# Patient Record
Sex: FEMALE | Race: WHITE | NOT HISPANIC OR LATINO | Employment: OTHER | URBAN - METROPOLITAN AREA
[De-identification: names, ages, dates, MRNs, and addresses within clinical notes are randomized per-mention and may not be internally consistent; named-entity substitution may affect disease eponyms.]

---

## 2017-11-16 ENCOUNTER — OFFICE VISIT (OUTPATIENT)
Dept: CARDIOLOGY | Facility: CLINIC | Age: 72
End: 2017-11-16

## 2017-11-16 VITALS
DIASTOLIC BLOOD PRESSURE: 83 MMHG | WEIGHT: 144 LBS | HEART RATE: 59 BPM | SYSTOLIC BLOOD PRESSURE: 145 MMHG | HEIGHT: 63 IN | BODY MASS INDEX: 25.52 KG/M2

## 2017-11-16 DIAGNOSIS — E78.5 HYPERLIPIDEMIA, UNSPECIFIED HYPERLIPIDEMIA TYPE: ICD-10-CM

## 2017-11-16 DIAGNOSIS — I10 ESSENTIAL HYPERTENSION: Primary | ICD-10-CM

## 2017-11-16 PROCEDURE — 93000 ELECTROCARDIOGRAM COMPLETE: CPT | Performed by: INTERNAL MEDICINE

## 2017-11-16 PROCEDURE — 99213 OFFICE O/P EST LOW 20 MIN: CPT | Performed by: INTERNAL MEDICINE

## 2017-11-16 RX ORDER — TELMISARTAN 40 MG/1
40 TABLET ORAL DAILY
COMMUNITY

## 2017-11-16 RX ORDER — LANOLIN ALCOHOL/MO/W.PET/CERES
1000 CREAM (GRAM) TOPICAL DAILY
COMMUNITY

## 2017-11-16 NOTE — PROGRESS NOTES
Subjective:       Karly Nash is a 71 y.o. female who here for follow up    CC  Yearly follow-up for the hypertension and hyperlipidemia  HPI     71 years old white female with known history of benign essential arterial hypertension, hyperlipidemia here for the follow-up    Karly Nash  here for follow up with no complaints of chest pain, sob, palpitation, syncope, near syncope  No side effects with current meds  No pnd, orthopnea    Problem List Items Addressed This Visit        Cardiovascular and Mediastinum    Essential hypertension - Primary    Relevant Medications    telmisartan (MICARDIS) 40 MG tablet    Hyperlipidemia        .    The following portions of the patient's history were reviewed and updated as appropriate: allergies, current medications, past family history, past medical history, past social history, past surgical history and problem list.    Past Medical History:   Diagnosis Date   • Bloating    • Blood in stool    • Diverticulosis    • Exercise-induced reactive airway disease    • Family history of colonic polyps    • Flatulence    • Gas pain    • GERD (gastroesophageal reflux disease)    • History of CT scan of abdomen    • HTN (hypertension)    • IBS (irritable bowel syndrome)    • Osteoarthritis     reports that she quit smoking about 30 years ago. Her smoking use included Cigarettes. She has never used smokeless tobacco. She reports that she drinks about 4.2 oz of alcohol per week  She reports that she does not use illicit drugs.  Family History   Problem Relation Age of Onset   • Hypertension Mother    • Hyperlipidemia Mother    • Hypertension Father    • Hyperlipidemia Father    • Colon polyps Brother    • Diabetes Brother    • Hypertension Brother    • Hyperlipidemia Brother    • Cancer Paternal Grandmother      esophageal   • Crohn's disease Other    • Heart failure Maternal Aunt        Review of Systems  Constitutional: No wt loss, fever, fatigue  Gastrointestinal: No nausea,  "abdominal pain  Behavioral/Psych: No insomnia or anxiety   Cardiovascular No chest pains or tightness in chest  Objective:       Physical Exam             Physical Exam  /83  Pulse 59  Ht 62.5\" (158.8 cm)  Wt 144 lb (65.3 kg)  BMI 25.92 kg/m2    General appearance: NAD, conversant   Eyes: anicteric sclerae, moist conjunctivae; no lid-lag; PERRLA   HENT: Atraumatic; oropharynx clear with moist mucous membranes and no mucosal ulcerations;  normal hard and soft palate   Neck: Trachea midline; FROM, supple, no thyromegaly or lymphadenopathy   Lungs: CTA, with normal respiratory effort and no intercostal retractions   CV: S1-S2 regular, no murmurs, no rub, no gallop   Abdomen: Soft, non-tender; no masses or HSM   Extremities: No peripheral edema or extremity lymphadenopathy  Skin: Normal temperature, turgor and texture; no rash, ulcers or subcutaneous nodules   Psych: Appropriate affect, alert and oriented to person, place and time           Cardiographics  @  ECG 12 Lead  Date/Time: 11/16/2017 11:31 AM  Performed by: ALVINA NASCIMENTO  Authorized by: ALVINA NASCIMENTO   Comparison: compared with previous ECG   Similar to previous ECG  Rhythm: sinus rhythm  Clinical impression: normal ECG            Echocardiogram:        Current Outpatient Prescriptions:   •  acetaminophen (TYLENOL) 500 MG tablet, Take 500 mg by mouth Every 6 (Six) Hours As Needed for mild pain (1-3)., Disp: , Rfl:   •  ascorbic acid (VITAMIN C) 500 MG tablet, Take 500 mg by mouth daily., Disp: , Rfl:   •  aspirin 81 MG tablet, Take 81 mg by mouth daily., Disp: , Rfl:   •  Calcium Carb-Cholecalciferol (CALCIUM 500 +D PO), Take  by mouth., Disp: , Rfl:   •  cetirizine (ZyrTEC) 10 MG tablet, Take 10 mg by mouth daily as needed for allergies., Disp: , Rfl:   •  Cholecalciferol (VITAMIN D-3) 1000 UNITS capsule, Take by mouth daily., Disp: , Rfl:   •  Coenzyme Q-10 200 MG capsule, Take  by mouth., Disp: , Rfl:   •  cyclobenzaprine " (FLEXERIL) 10 MG tablet, Take 10 mg by mouth 3 (three) times a day as needed for muscle spasms., Disp: , Rfl:   •  dicyclomine (BENTYL) 20 MG tablet, Take 20 mg by mouth Every 6 (Six) Hours., Disp: , Rfl:   •  estradiol (VAGIFEM) 10 MCG tablet vaginal tablet, Insert 1 tablet into the vagina 2 (two) times a week., Disp: , Rfl:   •  Garlic (GARLIQUE PO), Take  by mouth., Disp: , Rfl:   •  glucosamine-chondroitin 500-400 MG per tablet, Take 1 tablet by mouth 2 (two) times a day., Disp: , Rfl:   •  guaiFENesin (MUCINEX) 600 MG 12 hr tablet, Take 1,200 mg by mouth 2 (Two) Times a Day., Disp: , Rfl:   •  ipratropium-albuterol (COMBIVENT RESPIMAT)  MCG/ACT inhaler, Inhale 1 puff 4 (Four) Times a Day As Needed for wheezing., Disp: , Rfl:   •  meloxicam (MOBIC) 7.5 MG tablet, Take 7.5 mg by mouth Daily., Disp: , Rfl:   •  nebivolol (BYSTOLIC) 20 MG tablet, Take 20 mg by mouth daily., Disp: , Rfl:   •  Omega-3 Fatty Acids (FISH OIL PO), Take 2,400 mg by mouth 2 (two) times a day., Disp: , Rfl:   •  Omega-3-acid Ethyl Esters (LOVAZA PO), Take 1 g by mouth Daily., Disp: , Rfl:   •  omeprazole (PriLOSEC) 20 MG capsule, Take 20 mg by mouth 2 (two) times a day., Disp: , Rfl:   •  oyster shell calcium 500 MG tablet tablet, Take  by mouth 2 (Two) Times a Day., Disp: , Rfl:   •  Probiotic Product (PROBIOTIC ADVANCED PO), Take  by mouth., Disp: , Rfl:   •  rosuvastatin (CRESTOR) 20 MG tablet, Take 10 mg by mouth Daily., Disp: , Rfl:   •  simethicone (MYLICON) 80 MG chewable tablet, Chew 80 mg Every 6 (Six) Hours As Needed for flatulence., Disp: , Rfl:   •  telmisartan (MICARDIS) 40 MG tablet, Take 40 mg by mouth Daily., Disp: , Rfl:   •  Triamcinolone Acetonide (NASACORT ALLERGY 24HR NA), into each nostril., Disp: , Rfl:   •  vitamin B-12 (CYANOCOBALAMIN) 1000 MCG tablet, Take 1,000 mcg by mouth Daily., Disp: , Rfl:   •  vitamin B-6 (PYRIDOXINE) 50 MG tablet, Take 50 mg by mouth daily., Disp: , Rfl:   •  ALPRAZolam (XANAX) 0.25  MG tablet, Take 0.25 mg by mouth 2 (Two) Times a Day As Needed for anxiety., Disp: , Rfl:   •  hydrocortisone (ANUSOL-HC) 2.5 % rectal cream, Insert  into the rectum 2 (Two) Times a Day., Disp: , Rfl:    Assessment:        Patient Active Problem List   Diagnosis   • Essential hypertension   • Hyperlipidemia               Plan:            ICD-10-CM ICD-9-CM   1. Essential hypertension I10 401.9   2. Hyperlipidemia, unspecified hyperlipidemia type E78.5 272.4     1. Essential hypertension  Blood pressure well-controlled    2. Hyperlipidemia, unspecified hyperlipidemia type  Risk of the hyperlipidemia, importance of the treatment has been explained    Pros and cons of the statins has been explained    Regular blood workup as well as side effects including the liver failure, myelopathy death has been explained      Follow up in one year    COUNSELING:    Karly Danielle was given to patient for the following topics: diagnostic results, risk factor reductions, impressions, risks and benefits of treatment options and importance of treatment compliance .       SMOKING COUNSELING:    Counseling given: Not Answered      EMR Dragon/Transcription disclaimer:   Much of this encounter note is an electronic transcription/translation of spoken language to printed text. The electronic translation of spoken language may permit erroneous, or at times, nonsensical words or phrases to be inadvertently transcribed; Although I have reviewed the note for such errors, some may still exist.

## 2018-01-15 ENCOUNTER — OFFICE VISIT (OUTPATIENT)
Dept: GASTROENTEROLOGY | Facility: CLINIC | Age: 73
End: 2018-01-15

## 2018-01-15 VITALS
WEIGHT: 145.4 LBS | DIASTOLIC BLOOD PRESSURE: 86 MMHG | BODY MASS INDEX: 26.76 KG/M2 | SYSTOLIC BLOOD PRESSURE: 142 MMHG | TEMPERATURE: 97.2 F | HEIGHT: 62 IN

## 2018-01-15 DIAGNOSIS — Z83.71 FAMILY HISTORY OF POLYPS IN THE COLON: ICD-10-CM

## 2018-01-15 DIAGNOSIS — K21.9 GASTROESOPHAGEAL REFLUX DISEASE, ESOPHAGITIS PRESENCE NOT SPECIFIED: ICD-10-CM

## 2018-01-15 DIAGNOSIS — K58.9 IRRITABLE BOWEL SYNDROME, UNSPECIFIED TYPE: Primary | ICD-10-CM

## 2018-01-15 PROCEDURE — 99204 OFFICE O/P NEW MOD 45 MIN: CPT | Performed by: INTERNAL MEDICINE

## 2018-01-15 NOTE — PROGRESS NOTES
Answers for HPI/ROS submitted by the patient on 1/13/2018   Abdominal pain  Chronicity: chronic  Onset: more than 1 month ago  Onset quality: undetermined  Frequency: daily  Episode duration: 3 hours  Progression since onset: waxing and waning  Pain location: generalized abdominal region  Pain - numeric: 6/10  Pain quality: cramping  Radiates to: LUQ, RUQ, epigastric region, suprapubic region, pelvis  constipation: Yes  diarrhea: Yes  flatus: Yes  Aggravated by: eating  Relieved by: activity, bowel movements, certain positions, liquids, movement, palpation, passing flatus

## 2018-01-15 NOTE — PROGRESS NOTES
Chief Complaint   Patient presents with   • Irritable Bowel Syndrome        Karly Nash is a  72 y.o. female here for an initial visit for GERD, IBS, family history of polyps    HPI This 72-year-old white female patient of Dr. Raquel Ortiz presents with complaints of IBS and associated mucus as well as bowel pattern changes that may be related to either diet or antibiotic use.  She gave a long redundant condition of antibiotic exposure over the course of the last 2 years to address dental infections as well as possible enteritis.  Her last colonoscopy was performed May 2014 which revealed diverticulosis and hemorrhoids otherwise normal throughout.  She has used dicyclomine with some effect, was offered an alternative in the form of hyoscyamine but states this had caused significant xerostomia.  She had been to the emergency room in August 2017 with complaints of diarrhea, CT scan performed at that setting suggested possible mild thickening of the terminal ileum.  We talked about possible repeat been scan, she plans to go on a cruise in the near future and will take samples of Xifaxan in the event she has significant diarrhea in that setting.  She is to call with a progress report upon return.  If her symptoms persist I would start with the radiographic study and then consider endoscopic assessment as needed.    Past Medical History:   Diagnosis Date   • Bloating    • Blood in stool    • Diverticulosis    • Exercise-induced reactive airway disease    • Family history of colonic polyps    • Flatulence    • Gas pain    • GERD (gastroesophageal reflux disease)    • History of CT scan of abdomen    • HTN (hypertension)    • IBS (irritable bowel syndrome)    • Osteoarthritis        Current Outpatient Prescriptions   Medication Sig Dispense Refill   • ascorbic acid (VITAMIN C) 500 MG tablet Take 500 mg by mouth daily.     • aspirin 81 MG tablet Take 81 mg by mouth daily.     • Calcium Carb-Cholecalciferol (CALCIUM 500  +D PO) Take  by mouth.     • cetirizine (ZyrTEC) 10 MG tablet Take 10 mg by mouth daily as needed for allergies.     • Cholecalciferol (VITAMIN D-3) 1000 UNITS capsule Take by mouth daily.     • cyclobenzaprine (FLEXERIL) 10 MG tablet Take 10 mg by mouth 3 (three) times a day as needed for muscle spasms.     • dicyclomine (BENTYL) 20 MG tablet Take 20 mg by mouth Every 6 (Six) Hours.     • estradiol (VAGIFEM) 10 MCG tablet vaginal tablet Insert 1 tablet into the vagina 2 (two) times a week.     • Garlic (GARLIQUE PO) Take  by mouth.     • glucosamine-chondroitin 500-400 MG per tablet Take 1 tablet by mouth 2 (two) times a day.     • guaiFENesin (MUCINEX) 600 MG 12 hr tablet Take 1,200 mg by mouth 2 (Two) Times a Day.     • ipratropium-albuterol (COMBIVENT RESPIMAT)  MCG/ACT inhaler Inhale 1 puff 4 (Four) Times a Day As Needed for wheezing.     • meloxicam (MOBIC) 7.5 MG tablet Take 7.5 mg by mouth Daily.     • nebivolol (BYSTOLIC) 20 MG tablet Take 20 mg by mouth daily.     • Omega-3 Fatty Acids (FISH OIL PO) Take 2,400 mg by mouth 2 (two) times a day.     • omeprazole (PriLOSEC) 20 MG capsule Take 20 mg by mouth 2 (two) times a day.     • rosuvastatin (CRESTOR) 20 MG tablet Take 10 mg by mouth Daily.     • telmisartan (MICARDIS) 40 MG tablet Take 40 mg by mouth Daily.     • vitamin B-12 (CYANOCOBALAMIN) 1000 MCG tablet Take 1,000 mcg by mouth Daily.     • vitamin B-6 (PYRIDOXINE) 50 MG tablet Take 50 mg by mouth daily.     • acetaminophen (TYLENOL) 500 MG tablet Take 500 mg by mouth Every 6 (Six) Hours As Needed for mild pain (1-3).     • ALPRAZolam (XANAX) 0.25 MG tablet Take 0.25 mg by mouth 2 (Two) Times a Day As Needed for anxiety.     • Coenzyme Q-10 200 MG capsule Take  by mouth.     • hydrocortisone (ANUSOL-HC) 2.5 % rectal cream Insert  into the rectum 2 (Two) Times a Day.     • Omega-3-acid Ethyl Esters (LOVAZA PO) Take 1 g by mouth Daily.     • oyster shell calcium 500 MG tablet tablet Take  by  "mouth 2 (Two) Times a Day.     • Probiotic Product (PROBIOTIC ADVANCED PO) Take  by mouth.     • simethicone (MYLICON) 80 MG chewable tablet Chew 80 mg Every 6 (Six) Hours As Needed for flatulence.     • Triamcinolone Acetonide (NASACORT ALLERGY 24HR NA) into each nostril.       No current facility-administered medications for this visit.        PRN Meds:.    Allergies   Allergen Reactions   • Amoxicillin-Pot Clavulanate    • Aspartame And Phenylalanine    • Calcium Channel Blockers    • Celebrex [Celecoxib]    • Erythromycin    • Ibuprofen    • Lactose Intolerance (Gi)    • Niacin    • Norflex [Orphenadrine]    • Norvasc [Amlodipine]    • Penicillins Hives   • Vioxx [Rofecoxib]    • Zithromax [Azithromycin]    • Statins      States \" took Lipitor for 5 years then started some minor leg cramps, now on Crestor without any side effects\"       Social History     Social History   • Marital status:      Spouse name: N/A   • Number of children: N/A   • Years of education: N/A     Occupational History   • Not on file.     Social History Main Topics   • Smoking status: Former Smoker     Types: Cigarettes     Quit date: 1/1/1987   • Smokeless tobacco: Never Used   • Alcohol use 4.2 oz/week     7 Glasses of wine per week      Comment: wine   • Drug use: No   • Sexual activity: Defer     Other Topics Concern   • Not on file     Social History Narrative       Family History   Problem Relation Age of Onset   • Hypertension Mother    • Hyperlipidemia Mother    • Hypertension Father    • Hyperlipidemia Father    • Colon polyps Brother    • Diabetes Brother    • Hypertension Brother    • Hyperlipidemia Brother    • Cancer Paternal Grandmother      esophageal   • Crohn's disease Other    • Heart failure Maternal Aunt        Review of Systems   Constitutional: Negative for activity change, appetite change, fatigue and unexpected weight change.   HENT: Negative for congestion, facial swelling, sore throat, trouble swallowing " and voice change.    Eyes: Negative for photophobia and visual disturbance.   Respiratory: Negative for cough and choking.    Cardiovascular: Negative for chest pain.   Gastrointestinal: Positive for abdominal distention, constipation and diarrhea. Negative for abdominal pain, anal bleeding, blood in stool, nausea, rectal pain and vomiting.   Endocrine: Negative for polyphagia.   Musculoskeletal: Negative for arthralgias, gait problem and joint swelling.   Skin: Negative for color change, pallor and rash.   Allergic/Immunologic: Negative for food allergies.   Neurological: Negative for speech difficulty and headaches.   Hematological: Does not bruise/bleed easily.   Psychiatric/Behavioral: Negative for agitation, confusion and sleep disturbance.       Vitals:    01/15/18 1417   BP: 142/86   Temp: 97.2 °F (36.2 °C)       Physical Exam   Constitutional: She is oriented to person, place, and time. She appears well-developed and well-nourished.   HENT:   Head: Normocephalic.   Mouth/Throat: Oropharynx is clear and moist.   Eyes: Conjunctivae and EOM are normal.   Neck: Normal range of motion.   Cardiovascular: Normal rate and regular rhythm.    Pulmonary/Chest: Breath sounds normal.   Abdominal: Soft. Bowel sounds are normal.   Musculoskeletal: Normal range of motion.   Neurological: She is alert and oriented to person, place, and time.   Skin: Skin is warm and dry.   Psychiatric: She has a normal mood and affect. Her behavior is normal.       ASSESSMENT   #1 IBS: Predominantly loose bowel pattern with flareup, also significant abdominal bloating.  #2 diverticular disease  #3 family history of polyps  #4 GERD      PLAN  Samples of Xifaxan  Continue dicyclomine  Call with progress report upon return from [facility]  Consider CT scan if symptoms persist      ICD-10-CM ICD-9-CM   1. Irritable bowel syndrome, unspecified type K58.9 564.1   2. Family history of polyps in the colon Z83.71 V18.51   3. Gastroesophageal reflux  disease, esophagitis presence not specified K21.9 530.81          Answers for HPI/ROS submitted by the patient on 1/13/2018   Abdominal pain  Chronicity: chronic  Onset: more than 1 month ago  Onset quality: undetermined  Frequency: daily  Episode duration: 3 hours  Progression since onset: waxing and waning  Pain location: generalized abdominal region  Pain - numeric: 6/10  Pain quality: cramping  Radiates to: LUQ, RUQ, epigastric region, suprapubic region, pelvis  flatus: Yes  Aggravated by: eating  Relieved by: activity, bowel movements, certain positions, liquids, movement, palpation, passing flatus

## 2018-02-07 ENCOUNTER — TELEPHONE (OUTPATIENT)
Dept: GASTROENTEROLOGY | Facility: CLINIC | Age: 73
End: 2018-02-07

## 2018-02-07 NOTE — TELEPHONE ENCOUNTER
Called pt and pt reports she is doing fair.  She states she ate salad for the first time last night and had some meatballs with cheese in them.  She states she currently feels fine.  The cramping is better, but when she feels the needs to have a bm she better find a bathroom.    Her stools are watery after salads and milk.  She states for the most part she is doing ok.  She is asking if she should have the ct scan or should sit tight for now.  Advised would send message to Dr Blankenship.

## 2018-02-07 NOTE — TELEPHONE ENCOUNTER
----- Message from Fernando Davenport sent at 2/7/2018  2:24 PM EST -----  Regarding: PT CALLED  Contact: [phone]  PT CALLED TO LET  KNOW HOW SHE WAS DOING, SHE IS DOING FAIR, SHE IS TRYING TO WATCH WHAT SHE EATS FOR THE MOST PART, ONLY CRAMPS BEFORE SHE GOES TO THE BATHROOM. PT WOULD LIKE A CALL BACK FROM A NURSE TO SEE WHERE SHE GOES FROM HERE

## 2018-02-08 NOTE — TELEPHONE ENCOUNTER
If patient feels symptoms are improved she can observe for the present, however, if symptoms persist she will need CT scan of the abdomen and pelvis.  We'll let her decide.

## 2018-02-08 NOTE — TELEPHONE ENCOUNTER
Called pt and advised per Dr Blankenship that if the pt feels symptoms are improved she can observe for the present, however, if symptoms persist she will need ct scan of the abd and pelvis . Pt verb understanding.

## 2018-06-08 ENCOUNTER — OFFICE (OUTPATIENT)
Dept: URBAN - METROPOLITAN AREA CLINIC 75 | Facility: CLINIC | Age: 73
End: 2018-06-08

## 2018-06-08 VITALS — SYSTOLIC BLOOD PRESSURE: 84 MMHG | HEIGHT: 72 IN | WEIGHT: 170 LBS

## 2018-06-08 DIAGNOSIS — K58.0 IRRITABLE BOWEL SYNDROME WITH DIARRHEA: ICD-10-CM

## 2018-06-08 DIAGNOSIS — K57.32 DIVERTICULITIS OF LARGE INTESTINE WITHOUT PERFORATION OR ABS: ICD-10-CM

## 2018-06-08 DIAGNOSIS — K57.30 DIVERTICULOSIS OF LARGE INTESTINE WITHOUT PERFORATION OR ABS: ICD-10-CM

## 2018-06-08 PROCEDURE — 99204 OFFICE O/P NEW MOD 45 MIN: CPT | Performed by: INTERNAL MEDICINE

## 2018-06-08 RX ORDER — ELUXADOLINE 75 MG/1
150 TABLET, FILM COATED ORAL
Qty: 180 | Refills: 2 | Status: ACTIVE
Start: 2018-06-08

## 2018-09-14 ENCOUNTER — OFFICE (OUTPATIENT)
Dept: URBAN - METROPOLITAN AREA CLINIC 75 | Facility: CLINIC | Age: 73
End: 2018-09-14

## 2018-09-14 VITALS
DIASTOLIC BLOOD PRESSURE: 80 MMHG | HEART RATE: 63 BPM | HEIGHT: 62 IN | SYSTOLIC BLOOD PRESSURE: 138 MMHG | WEIGHT: 151 LBS

## 2018-09-14 DIAGNOSIS — K58.0 IRRITABLE BOWEL SYNDROME WITH DIARRHEA: ICD-10-CM

## 2018-09-14 DIAGNOSIS — K57.32 DIVERTICULITIS OF LARGE INTESTINE WITHOUT PERFORATION OR ABS: ICD-10-CM

## 2018-09-14 DIAGNOSIS — K57.30 DIVERTICULOSIS OF LARGE INTESTINE WITHOUT PERFORATION OR ABS: ICD-10-CM

## 2018-09-14 DIAGNOSIS — Z79.82 LONG TERM (CURRENT) USE OF ASPIRIN: ICD-10-CM

## 2018-09-14 DIAGNOSIS — K21.9 GASTRO-ESOPHAGEAL REFLUX DISEASE WITHOUT ESOPHAGITIS: ICD-10-CM

## 2018-09-14 PROCEDURE — 99213 OFFICE O/P EST LOW 20 MIN: CPT | Performed by: INTERNAL MEDICINE

## 2018-09-14 RX ORDER — ELUXADOLINE 75 MG/1
150 TABLET, FILM COATED ORAL
Qty: 180 | Refills: 2 | Status: ACTIVE
Start: 2018-06-08

## 2018-11-02 ENCOUNTER — OFFICE (OUTPATIENT)
Dept: URBAN - METROPOLITAN AREA CLINIC 1 | Facility: CLINIC | Age: 73
End: 2018-11-02

## 2018-11-02 ENCOUNTER — LAB (OUTPATIENT)
Dept: LAB | Facility: HOSPITAL | Age: 73
End: 2018-11-02

## 2018-11-02 ENCOUNTER — TRANSCRIBE ORDERS (OUTPATIENT)
Dept: ADMINISTRATIVE | Facility: HOSPITAL | Age: 73
End: 2018-11-02

## 2018-11-02 VITALS
DIASTOLIC BLOOD PRESSURE: 77 MMHG | HEIGHT: 62 IN | SYSTOLIC BLOOD PRESSURE: 129 MMHG | HEART RATE: 62 BPM | WEIGHT: 144 LBS

## 2018-11-02 DIAGNOSIS — Z79.82 LONG TERM (CURRENT) USE OF ASPIRIN: ICD-10-CM

## 2018-11-02 DIAGNOSIS — K57.30 DIVERTICULOSIS OF LARGE INTESTINE WITHOUT PERFORATION OR ABS: ICD-10-CM

## 2018-11-02 DIAGNOSIS — K58.0 IRRITABLE BOWEL SYNDROME WITH DIARRHEA: ICD-10-CM

## 2018-11-02 DIAGNOSIS — R10.11 RIGHT UPPER QUADRANT PAIN: Primary | ICD-10-CM

## 2018-11-02 DIAGNOSIS — R10.11 RIGHT UPPER QUADRANT PAIN: ICD-10-CM

## 2018-11-02 DIAGNOSIS — K21.9 GASTRO-ESOPHAGEAL REFLUX DISEASE WITHOUT ESOPHAGITIS: ICD-10-CM

## 2018-11-02 DIAGNOSIS — K57.32 DIVERTICULITIS OF COLON: ICD-10-CM

## 2018-11-02 DIAGNOSIS — Z80.0 FAMILY HISTORY OF MALIGNANT NEOPLASM OF DIGESTIVE ORGANS: ICD-10-CM

## 2018-11-02 DIAGNOSIS — R10.13 EPIGASTRIC PAIN: ICD-10-CM

## 2018-11-02 DIAGNOSIS — K21.9 CHRONIC GERD: ICD-10-CM

## 2018-11-02 DIAGNOSIS — K57.30 DIVERTICULOSIS OF LARGE INTESTINE WITHOUT HEMORRHAGE: ICD-10-CM

## 2018-11-02 DIAGNOSIS — K57.32 DIVERTICULITIS OF LARGE INTESTINE WITHOUT PERFORATION OR ABS: ICD-10-CM

## 2018-11-02 LAB
ALBUMIN SERPL-MCNC: 4.5 G/DL (ref 3.5–5.2)
ALP SERPL-CCNC: 80 U/L (ref 39–117)
ALT SERPL W P-5'-P-CCNC: 21 U/L (ref 1–33)
AMYLASE SERPL-CCNC: 46 U/L (ref 28–100)
AST SERPL-CCNC: 25 U/L (ref 1–32)
BILIRUB CONJ SERPL-MCNC: <0.2 MG/DL (ref 0–0.3)
BILIRUB INDIRECT SERPL-MCNC: NORMAL MG/DL
BILIRUB SERPL-MCNC: 0.5 MG/DL (ref 0.1–1.2)
LIPASE SERPL-CCNC: 28 U/L (ref 13–60)
PROT SERPL-MCNC: 8.2 G/DL (ref 6–8.5)

## 2018-11-02 PROCEDURE — 80076 HEPATIC FUNCTION PANEL: CPT

## 2018-11-02 PROCEDURE — 36415 COLL VENOUS BLD VENIPUNCTURE: CPT

## 2018-11-02 PROCEDURE — 83690 ASSAY OF LIPASE: CPT

## 2018-11-02 PROCEDURE — 82150 ASSAY OF AMYLASE: CPT | Performed by: INTERNAL MEDICINE

## 2018-11-02 PROCEDURE — 99214 OFFICE O/P EST MOD 30 MIN: CPT | Performed by: INTERNAL MEDICINE

## 2018-11-08 ENCOUNTER — OFFICE VISIT (OUTPATIENT)
Dept: CARDIOLOGY | Facility: CLINIC | Age: 73
End: 2018-11-08

## 2018-11-08 VITALS
SYSTOLIC BLOOD PRESSURE: 145 MMHG | HEART RATE: 61 BPM | BODY MASS INDEX: 25.52 KG/M2 | DIASTOLIC BLOOD PRESSURE: 84 MMHG | WEIGHT: 144 LBS | HEIGHT: 63 IN

## 2018-11-08 DIAGNOSIS — R94.31 ABNORMAL EKG: Primary | ICD-10-CM

## 2018-11-08 DIAGNOSIS — I10 ESSENTIAL HYPERTENSION: ICD-10-CM

## 2018-11-08 DIAGNOSIS — E78.5 HYPERLIPIDEMIA, UNSPECIFIED HYPERLIPIDEMIA TYPE: ICD-10-CM

## 2018-11-08 PROCEDURE — 99213 OFFICE O/P EST LOW 20 MIN: CPT | Performed by: INTERNAL MEDICINE

## 2018-11-08 PROCEDURE — 93000 ELECTROCARDIOGRAM COMPLETE: CPT | Performed by: INTERNAL MEDICINE

## 2018-11-08 RX ORDER — ELUXADOLINE 75 MG/1
TABLET, FILM COATED ORAL
COMMUNITY
Start: 2018-09-14

## 2018-11-08 NOTE — PROGRESS NOTES
" Subjective:        Karly Nash is a 72 y.o. female who here for follow up    CC  Follow up  HPI  Occasional cp    72-year-old female with known history of the benign essential arterial hypertension, hyperlipidemia abnormal EKG has occasional retrosternal atypical mole chest pain     Problem List Items Addressed This Visit        Cardiovascular and Mediastinum    Essential hypertension    Hyperlipidemia    Abnormal EKG - Primary        .    The following portions of the patient's history were reviewed and updated as appropriate: allergies, current medications, past family history, past medical history, past social history, past surgical history and problem list.    Past Medical History:   Diagnosis Date   • Bloating    • Blood in stool    • Diverticulosis    • Exercise-induced reactive airway disease    • Family history of colonic polyps    • Flatulence    • Gas pain    • GERD (gastroesophageal reflux disease)    • History of CT scan of abdomen    • HTN (hypertension)    • IBS (irritable bowel syndrome)    • Osteoarthritis      reports that she quit smoking about 31 years ago. Her smoking use included Cigarettes. She has never used smokeless tobacco. She reports that she drinks about 4.2 oz of alcohol per week . She reports that she does not use drugs.   Family History   Problem Relation Age of Onset   • Hypertension Mother    • Hyperlipidemia Mother    • Hypertension Father    • Hyperlipidemia Father    • Colon polyps Brother    • Diabetes Brother    • Hypertension Brother    • Hyperlipidemia Brother    • Cancer Paternal Grandmother         esophageal   • Crohn's disease Other    • Heart failure Maternal Aunt        Review of Systems  Constitutional: No wt loss, fever, fatigue  Gastrointestinal: No nausea, abdominal pain  Behavioral/Psych: No insomnia or anxiety   Cardiovascular chest pain  Objective:                 Physical Exam  /84   Pulse 61   Ht 158.8 cm (62.5\")   Wt 65.3 kg (144 lb)   BMI 25.92 " kg/m²     General appearance: NAD, conversant   Eyes: anicteric sclerae, moist conjunctivae; no lid-lag; PERRLA   HENT: Atraumatic; oropharynx clear with moist mucous membranes and no mucosal ulcerations;  normal hard and soft palate   Neck: Trachea midline; FROM, supple, no thyromegaly or lymphadenopathy   Lungs: CTA, with normal respiratory effort and no intercostal retractions   CV: S1-S2 regular, no murmurs, no rub, no gallop   Abdomen: Soft, non-tender; no masses or HSM   Extremities: No peripheral edema or extremity lymphadenopathy  Skin: Normal temperature, turgor and texture; no rash, ulcers or subcutaneous nodules   Psych: Appropriate affect, alert and oriented to person, place and time           Cardiographics  @  ECG 12 Lead  Date/Time: 11/8/2018 10:34 AM  Performed by: Zaid Hargrove MD  Authorized by: Zaid Hargrove MD   Comparison: compared with previous ECG   Similar to previous ECG  Rhythm: sinus rhythm  ST Flattening: all  Clinical impression: non-specific ECG            Echocardiogram:        Current Outpatient Prescriptions:   •  acetaminophen (TYLENOL) 500 MG tablet, Take 500 mg by mouth Every 6 (Six) Hours As Needed for mild pain (1-3)., Disp: , Rfl:   •  ALPRAZolam (XANAX) 0.25 MG tablet, Take 0.25 mg by mouth As Needed for Anxiety., Disp: , Rfl:   •  ascorbic acid (VITAMIN C) 500 MG tablet, Take 500 mg by mouth daily., Disp: , Rfl:   •  aspirin 81 MG tablet, Take 81 mg by mouth daily., Disp: , Rfl:   •  Calcium Carb-Cholecalciferol (CALCIUM 500 +D PO), Take  by mouth., Disp: , Rfl:   •  cetirizine (ZyrTEC) 10 MG tablet, Take 10 mg by mouth daily as needed for allergies., Disp: , Rfl:   •  Cholecalciferol (VITAMIN D-3) 1000 UNITS capsule, Take by mouth daily., Disp: , Rfl:   •  Coenzyme Q-10 200 MG capsule, Take  by mouth., Disp: , Rfl:   •  dicyclomine (BENTYL) 20 MG tablet, Take 20 mg by mouth As Needed., Disp: , Rfl:   •  estradiol (VAGIFEM) 10 MCG tablet vaginal tablet,  Insert 1 tablet into the vagina 2 (two) times a week., Disp: , Rfl:   •  Garlic (GARLIQUE PO), Take  by mouth., Disp: , Rfl:   •  glucosamine-chondroitin 500-400 MG per tablet, Take 1 tablet by mouth 2 (two) times a day., Disp: , Rfl:   •  guaiFENesin (MUCINEX) 600 MG 12 hr tablet, Take 1,200 mg by mouth 2 (Two) Times a Day., Disp: , Rfl:   •  hydrocortisone (ANUSOL-HC) 2.5 % rectal cream, Insert  into the rectum 2 (Two) Times a Day., Disp: , Rfl:   •  nebivolol (BYSTOLIC) 20 MG tablet, Take 20 mg by mouth daily., Disp: , Rfl:   •  Omega-3-acid Ethyl Esters (LOVAZA PO), Take 1 g by mouth Daily., Disp: , Rfl:   •  omeprazole (PriLOSEC) 20 MG capsule, Take 20 mg by mouth 2 (two) times a day., Disp: , Rfl:   •  oyster shell calcium 500 MG tablet tablet, Take  by mouth 2 (Two) Times a Day., Disp: , Rfl:   •  rosuvastatin (CRESTOR) 20 MG tablet, Take 10 mg by mouth Daily., Disp: , Rfl:   •  simethicone (MYLICON) 80 MG chewable tablet, Chew 80 mg Every 6 (Six) Hours As Needed for flatulence., Disp: , Rfl:   •  telmisartan (MICARDIS) 40 MG tablet, Take 40 mg by mouth Daily., Disp: , Rfl:   •  Triamcinolone Acetonide (NASACORT ALLERGY 24HR NA), into each nostril., Disp: , Rfl:   •  VIBERZI 75 MG tablet, , Disp: , Rfl:   •  vitamin B-12 (CYANOCOBALAMIN) 1000 MCG tablet, Take 1,000 mcg by mouth Daily., Disp: , Rfl:   •  vitamin B-6 (PYRIDOXINE) 50 MG tablet, Take 50 mg by mouth daily., Disp: , Rfl:   •  cyclobenzaprine (FLEXERIL) 10 MG tablet, Take 10 mg by mouth As Needed for Muscle Spasms., Disp: , Rfl:    Assessment:        Patient Active Problem List   Diagnosis   • Essential hypertension   • Hyperlipidemia               Plan:            ICD-10-CM ICD-9-CM   1. Abnormal EKG R94.31 794.31   2. Essential hypertension I10 401.9   3. Hyperlipidemia, unspecified hyperlipidemia type E78.5 272.4     1. Abnormal EKG  Workup pending    2. Essential hypertension  Control    3. Hyperlipidemia, unspecified hyperlipidemia  type  Counseling done       bp better at ho,me    ETT IF NEED GALL BLADDER SURGERY    SEE IN 1 YR  COUNSELING:    Karly OlveratCounseling was given to patient for the following topics: diagnostic results, risk factor reductions, impressions, risks and benefits of treatment options and importance of treatment compliance .       SMOKING COUNSELING:    Counseling given: Not Answered      EMR Dragon/Transcription disclaimer:   Much of this encounter note is an electronic transcription/translation of spoken language to printed text. The electronic translation of spoken language may permit erroneous, or at times, nonsensical words or phrases to be inadvertently transcribed; Although I have reviewed the note for such errors, some may still exist.

## 2019-05-15 ENCOUNTER — OFFICE (OUTPATIENT)
Dept: URBAN - METROPOLITAN AREA CLINIC 75 | Facility: CLINIC | Age: 74
End: 2019-05-15

## 2019-05-15 VITALS
WEIGHT: 145 LBS | SYSTOLIC BLOOD PRESSURE: 138 MMHG | HEIGHT: 62 IN | HEART RATE: 61 BPM | DIASTOLIC BLOOD PRESSURE: 79 MMHG

## 2019-05-15 DIAGNOSIS — K58.0 IRRITABLE BOWEL SYNDROME WITH DIARRHEA: ICD-10-CM

## 2019-05-15 DIAGNOSIS — R10.11 RIGHT UPPER QUADRANT PAIN: ICD-10-CM

## 2019-05-15 DIAGNOSIS — Z79.82 LONG TERM (CURRENT) USE OF ASPIRIN: ICD-10-CM

## 2019-05-15 DIAGNOSIS — Z12.11 ENCOUNTER FOR SCREENING FOR MALIGNANT NEOPLASM OF COLON: ICD-10-CM

## 2019-05-15 DIAGNOSIS — K21.9 GASTRO-ESOPHAGEAL REFLUX DISEASE WITHOUT ESOPHAGITIS: ICD-10-CM

## 2019-05-15 DIAGNOSIS — R10.13 EPIGASTRIC PAIN: ICD-10-CM

## 2019-05-15 DIAGNOSIS — K64.9 UNSPECIFIED HEMORRHOIDS: ICD-10-CM

## 2019-05-15 DIAGNOSIS — K57.32 DIVERTICULITIS OF LARGE INTESTINE WITHOUT PERFORATION OR ABS: ICD-10-CM

## 2019-05-15 PROCEDURE — 99214 OFFICE O/P EST MOD 30 MIN: CPT | Performed by: INTERNAL MEDICINE

## 2019-05-15 RX ORDER — PRAMOXINE HYDROCHLORIDE HYDROCORTISONE ACETATE 100; 100 MG/10G; MG/10G
AEROSOL, FOAM TOPICAL
Qty: 30 | Refills: 3 | Status: COMPLETED
Start: 2019-05-15 | End: 2020-10-13

## 2019-06-25 ENCOUNTER — APPOINTMENT (OUTPATIENT)
Dept: WOMENS IMAGING | Facility: HOSPITAL | Age: 74
End: 2019-06-25

## 2019-06-25 PROCEDURE — 77067 SCR MAMMO BI INCL CAD: CPT | Performed by: RADIOLOGY

## 2019-06-25 PROCEDURE — 77063 BREAST TOMOSYNTHESIS BI: CPT | Performed by: RADIOLOGY

## 2019-11-04 NOTE — PROGRESS NOTES
Patient Name: Karly Nash  :1945  Age: 73 y.o.  Primary Cardiologist: Zaid Hargrove MD  Encounter Provider:  MARILIA Guardado      Chief Complaint:   Chief Complaint   Patient presents with   • Hypertension         HPI this 73-year-old female, new to this provider, comes today in annual follow-up with history of hypertension, hyperlipidemia, abnormal ECG, IBS, and GERD.  Last echo in 2016 revealed LV wall contraction normal, EF 63.3% no evidence of pericardial effusion.  Stress testing performed at that time revealed no evidence of myocardial ischemia.  No recent lab work available for review at this time.  Patient is currently sinus rhythm, bradycardic, heart rate 57 on ECG in office today.  Recent CT calcium scoring shows mild coronary artery calcium with no plaque and coronary arteries, however a fatty deposit was noted on the myocardial wall with recommendation to have a cardiac work-up.    Patient Active Problem List   Diagnosis   • Essential hypertension   • Hyperlipidemia   • Abnormal EKG           The following portions of the patient's history were reviewed and updated as appropriate: allergies, current medications, past family history, past medical history, past social history, past surgical history and problem list.    Current Outpatient Medications on File Prior to Visit   Medication Sig Dispense Refill   • acetaminophen (TYLENOL) 500 MG tablet Take 500 mg by mouth Every 6 (Six) Hours As Needed for mild pain (1-3).     • ALPRAZolam (XANAX) 0.25 MG tablet Take 0.25 mg by mouth As Needed for Anxiety.     • ascorbic acid (VITAMIN C) 500 MG tablet Take 500 mg by mouth daily.     • aspirin 81 MG tablet Take 81 mg by mouth daily.     • Calcium Carb-Cholecalciferol (CALCIUM 500 +D PO) Take  by mouth.     • cetirizine (ZyrTEC) 10 MG tablet Take 10 mg by mouth daily as needed for allergies.     • Cholecalciferol (VITAMIN D-3) 1000 UNITS capsule Take by mouth daily.     • Coenzyme  Q-10 200 MG capsule Take  by mouth.     • cyclobenzaprine (FLEXERIL) 10 MG tablet Take 10 mg by mouth As Needed for Muscle Spasms.     • estradiol (VAGIFEM) 10 MCG tablet vaginal tablet Insert 1 tablet into the vagina 2 (two) times a week.     • Garlic (GARLIQUE PO) Take  by mouth.     • glucosamine-chondroitin 500-400 MG per tablet Take 1 tablet by mouth 2 (two) times a day.     • guaiFENesin (MUCINEX) 600 MG 12 hr tablet Take 1,200 mg by mouth 2 (Two) Times a Day.     • hydrocortisone (ANUSOL-HC) 2.5 % rectal cream Insert  into the rectum 2 (Two) Times a Day.     • nebivolol (BYSTOLIC) 20 MG tablet Take 20 mg by mouth daily.     • Omega-3-acid Ethyl Esters (LOVAZA PO) Take 1 g by mouth Daily.     • oyster shell calcium 500 MG tablet tablet Take  by mouth 2 (Two) Times a Day.     • rosuvastatin (CRESTOR) 20 MG tablet Take 10 mg by mouth Daily.     • simethicone (MYLICON) 80 MG chewable tablet Chew 80 mg Every 6 (Six) Hours As Needed for flatulence.     • telmisartan (MICARDIS) 40 MG tablet Take 40 mg by mouth Daily.     • Triamcinolone Acetonide (NASACORT ALLERGY 24HR NA) into each nostril.     • VIBERZI 75 MG tablet      • vitamin B-12 (CYANOCOBALAMIN) 1000 MCG tablet Take 1,000 mcg by mouth Daily.     • vitamin B-6 (PYRIDOXINE) 50 MG tablet Take 50 mg by mouth daily.     • omeprazole (PriLOSEC) 20 MG capsule Take 20 mg by mouth Daily.     • [DISCONTINUED] dicyclomine (BENTYL) 20 MG tablet Take 20 mg by mouth As Needed.       No current facility-administered medications on file prior to visit.        Past Medical History:   Diagnosis Date   • Bloating    • Blood in stool    • Diverticulosis    • Exercise-induced reactive airway disease    • Family history of colonic polyps    • Flatulence    • Gas pain    • GERD (gastroesophageal reflux disease)    • History of CT scan of abdomen    • HTN (hypertension)    • IBS (irritable bowel syndrome)    • Osteoarthritis        Past Surgical History:   Procedure Laterality  Date   • ADENOIDECTOMY     • APPENDECTOMY     • BUNIONECTOMY Right    • CARPAL TUNNEL RELEASE  2017    RT   • COLONOSCOPY  2009    sig tics, ih   • COLONOSCOPY  2014    NTEH, tortuous colon, diverticulosis in the sigmoid colon, IH.      • ENDOSCOPY  2009    sm hh, gastritis, duidenitis, - h pylori   • OTHER SURGICAL HISTORY Left 2005    5th met head head fracture and repair   • OVARY SURGERY     • TONSILLECTOMY     • UPPER GASTROINTESTINAL ENDOSCOPY  2009    with SBFT; nml       Family History   Problem Relation Age of Onset   • Hypertension Mother    • Hyperlipidemia Mother    • Hypertension Father    • Hyperlipidemia Father    • Colon polyps Brother    • Diabetes Brother    • Hypertension Brother    • Hyperlipidemia Brother    • Cancer Paternal Grandmother         esophageal   • Crohn's disease Other    • Heart failure Maternal Aunt        Social History     Socioeconomic History   • Marital status:      Spouse name: Not on file   • Number of children: Not on file   • Years of education: Not on file   • Highest education level: Not on file   Tobacco Use   • Smoking status: Former Smoker     Types: Cigarettes     Last attempt to quit: 1987     Years since quittin.8   • Smokeless tobacco: Never Used   Substance and Sexual Activity   • Alcohol use: Yes     Alcohol/week: 4.2 oz     Types: 7 Glasses of wine per week     Comment: wine   • Drug use: No   • Sexual activity: Defer       Review of Systems   Constitution: Negative for diaphoresis, weakness and malaise/fatigue.   HENT: Negative for nosebleeds and stridor.    Cardiovascular: Positive for dyspnea on exertion. Negative for chest pain, claudication, irregular heartbeat, leg swelling, near-syncope, orthopnea, palpitations, paroxysmal nocturnal dyspnea and syncope.   Respiratory: Positive for shortness of breath. Negative for cough, hemoptysis and wheezing.    Gastrointestinal: Negative for abdominal pain,  "constipation, diarrhea, hematemesis, hematochezia, melena, nausea and vomiting.   Neurological: Negative for dizziness, focal weakness and light-headedness.       OBJECTIVE:   Vital Signs  Vitals:    19 0957   BP: 172/82   Pulse: 58     Estimated body mass index is 26.1 kg/m² as calculated from the following:    Height as of this encounter: 158.8 cm (62.5\").    Weight as of this encounter: 65.8 kg (145 lb).    Physical Exam   Constitutional: She is oriented to person, place, and time. She appears well-developed and well-nourished. No distress.   HENT:   Head: Normocephalic and atraumatic.   Eyes: Conjunctivae and EOM are normal. Pupils are equal, round, and reactive to light.   Neck: Normal range of motion. Neck supple. No JVD present. No tracheal deviation present. No thyromegaly present.   Cardiovascular: Normal rate, regular rhythm, normal heart sounds and intact distal pulses. Exam reveals no gallop and no friction rub.   No murmur heard.  Pulmonary/Chest: Effort normal and breath sounds normal. No respiratory distress. She has no wheezes. She has no rales. She exhibits no tenderness.   Abdominal: Soft. Bowel sounds are normal. She exhibits no distension. There is no tenderness.   Musculoskeletal: Normal range of motion. She exhibits no edema, tenderness or deformity.   Neurological: She is alert and oriented to person, place, and time.   Skin: Skin is warm and dry. No rash noted. She is not diaphoretic. No erythema. No pallor.   Psychiatric: She has a normal mood and affect. Her behavior is normal.         ECG 12 Lead  Date/Time: 2019 10:24 AM  Performed by: Lauryn Pereira APRN  Authorized by: Lauryn Pereira APRN   Comparison: compared with previous ECG from 2018  Similar to previous ECG  Rhythm: sinus bradycardia  Rate: bradycardic  QRS axis: normal    Clinical impression: abnormal EKG            Stress Testin2016  Interpretation Summary     Asymptomatic for chest pain. " Specificity of study reduced secondary to baseline Non-specific ST-T wave abnormalities, however ECG is not suggestive of ischemia.  No Ectopy.  Blood pressure response was Hypertension, but quickly returned to baseline in recovery  Duke treadmill score is 8 which estimates an annual cardiovascular mortality of 0% and 5 year survival of 95%. Using the Duke Score there is low probability of angiographic coronary disease.          Cardiac Echo:  4/2016  Interpretation Summary     · All left ventricular wall segments contract normally.  · Left Ventricle: Calculated EF = 63.3%.  · There is no evidence of pericardial effusion           ASSESSMENT:      Diagnosis Plan   1. Abnormal ECG  ECG 12 Lead    Stress Test With Myocardial Perfusion One Day    Adult Transthoracic Echo Complete W/ Cont if Necessary Per Protocol   2. Abnormal screening CT of chest  Stress Test With Myocardial Perfusion One Day    Adult Transthoracic Echo Complete W/ Cont if Necessary Per Protocol   3. Essential hypertension     4. Hyperlipidemia, unspecified hyperlipidemia type     5. Abnormal EKG           PLAN OF CARE:     1.  Hypertension-blood pressure in office today is currently elevated, patient suspects this is due to receiving recent CT results that were concerning regarding fat deposit in myocardial wall..  Echo and ischemic work-up as above.  Currently on nebivolol and telmisartan.  Blood pressure at home is normally much better controlled systolic typically in the 130s.  Will not adjust medications at this time, however if she notices that her systolic pressure trends above 150 she will call the office for further titration of meds.    Importance of controlling hypertension and blood pressure  checkup on the regular basis has been explained  Hypertension as a silent killer has been discussed  Risk reduction of the weight and regular exercises to control the hypertension has been explained      2.  Hyperlipidemia-on statin therapy.  Lipid  panel done mid October at [place] shows good control.  Continue statin therapy.    Risk of the hyperlipidemia, importance of the treatment has been explained  Pros and cons of the statins has been explained  Regular blood workup as well as side effects including the liver failure, myelopathy death has been explained        3.  Abnormal ECG-on beta-blockade.  ECG in office today reveals sinus bradycardia, rate 57, similar to prior tracing on November 8, 2018.  Continue nebivolol.    4.  Abnormal CT calcium scoring-fatty deposit noted on myocardial wall with no plaque in coronary arteries and mild coronary artery calcium.  Further work-up recommended.  Will obtain stress test and echo as well as update fasting lipid panel and LFTs.    It was explained to the patient that stress testing carries 85% specificity/sensitivity, and does not rule out future cardiac event.  Risks of the procedure were explained to the patient including shortness of breath, induction of myocardial infarction, and dizziness.  Patient is agreeable to proceeding with stress testing.         Sincerely,   MARILIA Guardado  [facility]  11/05/19  10:44 AM

## 2019-11-05 ENCOUNTER — OFFICE VISIT (OUTPATIENT)
Dept: CARDIOLOGY | Facility: CLINIC | Age: 74
End: 2019-11-05

## 2019-11-05 VITALS
HEIGHT: 63 IN | DIASTOLIC BLOOD PRESSURE: 82 MMHG | HEART RATE: 58 BPM | BODY MASS INDEX: 25.69 KG/M2 | SYSTOLIC BLOOD PRESSURE: 172 MMHG | WEIGHT: 145 LBS

## 2019-11-05 DIAGNOSIS — E78.5 HYPERLIPIDEMIA, UNSPECIFIED HYPERLIPIDEMIA TYPE: ICD-10-CM

## 2019-11-05 DIAGNOSIS — R94.31 ABNORMAL ECG: Primary | ICD-10-CM

## 2019-11-05 DIAGNOSIS — R93.89 ABNORMAL SCREENING CT OF CHEST: ICD-10-CM

## 2019-11-05 DIAGNOSIS — R94.31 ABNORMAL EKG: ICD-10-CM

## 2019-11-05 DIAGNOSIS — I10 ESSENTIAL HYPERTENSION: ICD-10-CM

## 2019-11-05 PROCEDURE — 93000 ELECTROCARDIOGRAM COMPLETE: CPT | Performed by: NURSE PRACTITIONER

## 2019-11-05 PROCEDURE — 99214 OFFICE O/P EST MOD 30 MIN: CPT | Performed by: NURSE PRACTITIONER

## 2019-11-07 ENCOUNTER — HOSPITAL ENCOUNTER (OUTPATIENT)
Dept: CARDIOLOGY | Facility: HOSPITAL | Age: 74
Discharge: HOME | End: 2019-11-07
Admitting: NURSE PRACTITIONER

## 2019-11-07 VITALS
SYSTOLIC BLOOD PRESSURE: 168 MMHG | HEART RATE: 55 BPM | WEIGHT: 145 LBS | DIASTOLIC BLOOD PRESSURE: 78 MMHG | HEIGHT: 62 IN | BODY MASS INDEX: 26.68 KG/M2

## 2019-11-07 LAB
BH CV ECHO MEAS - ACS: 1.8 CM
BH CV ECHO MEAS - AO MAX PG (FULL): 3.3 MMHG
BH CV ECHO MEAS - AO MAX PG: 8.2 MMHG
BH CV ECHO MEAS - AO MEAN PG (FULL): 2 MMHG
BH CV ECHO MEAS - AO MEAN PG: 5 MMHG
BH CV ECHO MEAS - AO ROOT AREA (BSA CORRECTED): 1.4
BH CV ECHO MEAS - AO ROOT AREA: 4.5 CM^2
BH CV ECHO MEAS - AO ROOT DIAM: 2.4 CM
BH CV ECHO MEAS - AO V2 MAX: 143 CM/SEC
BH CV ECHO MEAS - AO V2 MEAN: 100 CM/SEC
BH CV ECHO MEAS - AO V2 VTI: 33.8 CM
BH CV ECHO MEAS - AVA(I,A): 2.3 CM^2
BH CV ECHO MEAS - AVA(I,D): 2.3 CM^2
BH CV ECHO MEAS - AVA(V,A): 2.4 CM^2
BH CV ECHO MEAS - AVA(V,D): 2.4 CM^2
BH CV ECHO MEAS - BSA(HAYCOCK): 1.7 M^2
BH CV ECHO MEAS - BSA: 1.7 M^2
BH CV ECHO MEAS - BZI_BMI: 26.5 KILOGRAMS/M^2
BH CV ECHO MEAS - BZI_METRIC_HEIGHT: 157.5 CM
BH CV ECHO MEAS - BZI_METRIC_WEIGHT: 65.8 KG
BH CV ECHO MEAS - EDV(CUBED): 59.3 ML
BH CV ECHO MEAS - EDV(MOD-SP2): 55 ML
BH CV ECHO MEAS - EDV(MOD-SP4): 58 ML
BH CV ECHO MEAS - EDV(TEICH): 65.9 ML
BH CV ECHO MEAS - EF(CUBED): 86.5 %
BH CV ECHO MEAS - EF(MOD-BP): 65 %
BH CV ECHO MEAS - EF(MOD-SP2): 65.5 %
BH CV ECHO MEAS - EF(MOD-SP4): 65.5 %
BH CV ECHO MEAS - EF(TEICH): 80.7 %
BH CV ECHO MEAS - ESV(CUBED): 8 ML
BH CV ECHO MEAS - ESV(MOD-SP2): 19 ML
BH CV ECHO MEAS - ESV(MOD-SP4): 20 ML
BH CV ECHO MEAS - ESV(TEICH): 12.7 ML
BH CV ECHO MEAS - FS: 48.7 %
BH CV ECHO MEAS - IVS/LVPW: 1
BH CV ECHO MEAS - IVSD: 1.1 CM
BH CV ECHO MEAS - LA DIMENSION: 3.7 CM
BH CV ECHO MEAS - LA/AO: 1.5
BH CV ECHO MEAS - LAT PEAK E' VEL: 8.5 CM/SEC
BH CV ECHO MEAS - LV DIASTOLIC VOL/BSA (35-75): 34.8 ML/M^2
BH CV ECHO MEAS - LV MASS(C)D: 140.1 GRAMS
BH CV ECHO MEAS - LV MASS(C)DI: 84 GRAMS/M^2
BH CV ECHO MEAS - LV MAX PG: 4.9 MMHG
BH CV ECHO MEAS - LV MEAN PG: 3 MMHG
BH CV ECHO MEAS - LV SYSTOLIC VOL/BSA (12-30): 12 ML/M^2
BH CV ECHO MEAS - LV V1 MAX: 111 CM/SEC
BH CV ECHO MEAS - LV V1 MEAN: 73.2 CM/SEC
BH CV ECHO MEAS - LV V1 VTI: 24.8 CM
BH CV ECHO MEAS - LVIDD: 3.9 CM
BH CV ECHO MEAS - LVIDS: 2 CM
BH CV ECHO MEAS - LVLD AP2: 6.6 CM
BH CV ECHO MEAS - LVLD AP4: 7 CM
BH CV ECHO MEAS - LVLS AP2: 5.7 CM
BH CV ECHO MEAS - LVLS AP4: 5.2 CM
BH CV ECHO MEAS - LVOT AREA (M): 3.1 CM^2
BH CV ECHO MEAS - LVOT AREA: 3.1 CM^2
BH CV ECHO MEAS - LVOT DIAM: 2 CM
BH CV ECHO MEAS - LVPWD: 1.1 CM
BH CV ECHO MEAS - MED PEAK E' VEL: 9.1 CM/SEC
BH CV ECHO MEAS - MV A DUR: 0.16 SEC
BH CV ECHO MEAS - MV A MAX VEL: 123 CM/SEC
BH CV ECHO MEAS - MV DEC SLOPE: 379 CM/SEC^2
BH CV ECHO MEAS - MV DEC TIME: 0.28 SEC
BH CV ECHO MEAS - MV E MAX VEL: 71.3 CM/SEC
BH CV ECHO MEAS - MV E/A: 0.58
BH CV ECHO MEAS - MV MAX PG: 5.9 MMHG
BH CV ECHO MEAS - MV MEAN PG: 2 MMHG
BH CV ECHO MEAS - MV P1/2T MAX VEL: 88.2 CM/SEC
BH CV ECHO MEAS - MV P1/2T: 68.2 MSEC
BH CV ECHO MEAS - MV V2 MAX: 121 CM/SEC
BH CV ECHO MEAS - MV V2 MEAN: 58 CM/SEC
BH CV ECHO MEAS - MV V2 VTI: 32.4 CM
BH CV ECHO MEAS - MVA P1/2T LCG: 2.5 CM^2
BH CV ECHO MEAS - MVA(P1/2T): 3.2 CM^2
BH CV ECHO MEAS - MVA(VTI): 2.4 CM^2
BH CV ECHO MEAS - PA ACC TIME: 0.17 SEC
BH CV ECHO MEAS - PA MAX PG (FULL): 2.9 MMHG
BH CV ECHO MEAS - PA MAX PG: 4 MMHG
BH CV ECHO MEAS - PA PR(ACCEL): 4.3 MMHG
BH CV ECHO MEAS - PA V2 MAX: 100 CM/SEC
BH CV ECHO MEAS - PULM A REVS DUR: 0.18 SEC
BH CV ECHO MEAS - PULM A REVS VEL: 29.6 CM/SEC
BH CV ECHO MEAS - PULM DIAS VEL: 34.4 CM/SEC
BH CV ECHO MEAS - PULM S/D: 1.4
BH CV ECHO MEAS - PULM SYS VEL: 49 CM/SEC
BH CV ECHO MEAS - PVA(V,A): 2 CM^2
BH CV ECHO MEAS - PVA(V,D): 2 CM^2
BH CV ECHO MEAS - QP/QS: 0.64
BH CV ECHO MEAS - RAP SYSTOLE: 3 MMHG
BH CV ECHO MEAS - RV MAX PG: 1.1 MMHG
BH CV ECHO MEAS - RV MEAN PG: 1 MMHG
BH CV ECHO MEAS - RV V1 MAX: 52.5 CM/SEC
BH CV ECHO MEAS - RV V1 MEAN: 37.2 CM/SEC
BH CV ECHO MEAS - RV V1 VTI: 13.1 CM
BH CV ECHO MEAS - RVOT AREA: 3.8 CM^2
BH CV ECHO MEAS - RVOT DIAM: 2.2 CM
BH CV ECHO MEAS - RVSP: 36.6 MMHG
BH CV ECHO MEAS - SI(AO): 91.7 ML/M^2
BH CV ECHO MEAS - SI(CUBED): 30.8 ML/M^2
BH CV ECHO MEAS - SI(LVOT): 46.7 ML/M^2
BH CV ECHO MEAS - SI(MOD-SP2): 21.6 ML/M^2
BH CV ECHO MEAS - SI(MOD-SP4): 22.8 ML/M^2
BH CV ECHO MEAS - SI(TEICH): 31.9 ML/M^2
BH CV ECHO MEAS - SV(AO): 152.9 ML
BH CV ECHO MEAS - SV(CUBED): 51.3 ML
BH CV ECHO MEAS - SV(LVOT): 77.9 ML
BH CV ECHO MEAS - SV(MOD-SP2): 36 ML
BH CV ECHO MEAS - SV(MOD-SP4): 38 ML
BH CV ECHO MEAS - SV(RVOT): 49.8 ML
BH CV ECHO MEAS - SV(TEICH): 53.2 ML
BH CV ECHO MEAS - TAPSE (>1.6): 2.4 CM
BH CV ECHO MEAS - TR MAX VEL: 290 CM/SEC
BH CV ECHO MEASUREMENTS AVERAGE E/E' RATIO: 8.1
BH CV XLRA - RV BASE: 2.8 CM
BH CV XLRA - RV LENGTH: 6.4 CM
BH CV XLRA - RV MID: 2.6 CM
BH CV XLRA - TDI S': 15.1 CM/SEC
LEFT ATRIUM VOLUME INDEX: 24 ML/M2
MAXIMAL PREDICTED HEART RATE: 148 BPM
STRESS TARGET HR: 126 BPM

## 2019-11-07 PROCEDURE — 93306 TTE W/DOPPLER COMPLETE: CPT

## 2019-11-07 PROCEDURE — 93306 TTE W/DOPPLER COMPLETE: CPT | Performed by: INTERNAL MEDICINE

## 2019-11-18 ENCOUNTER — HOSPITAL ENCOUNTER (OUTPATIENT)
Dept: CARDIOLOGY | Facility: HOSPITAL | Age: 74
Discharge: HOME | End: 2019-11-18

## 2019-11-18 VITALS — BODY MASS INDEX: 26.68 KG/M2 | HEIGHT: 62 IN | WEIGHT: 145 LBS

## 2019-11-18 LAB
BH CV STRESS BP STAGE 1: NORMAL
BH CV STRESS BP STAGE 2: NORMAL
BH CV STRESS BP STAGE 3: NORMAL
BH CV STRESS DURATION MIN STAGE 1: 3
BH CV STRESS DURATION MIN STAGE 2: 3
BH CV STRESS DURATION MIN STAGE 3: 1
BH CV STRESS DURATION SEC STAGE 1: 0
BH CV STRESS DURATION SEC STAGE 2: 0
BH CV STRESS DURATION SEC STAGE 3: 42
BH CV STRESS GRADE STAGE 1: 10
BH CV STRESS GRADE STAGE 2: 12
BH CV STRESS GRADE STAGE 3: 14
BH CV STRESS HR STAGE 1: 97
BH CV STRESS HR STAGE 2: 115
BH CV STRESS HR STAGE 3: 130
BH CV STRESS METS STAGE 1: 4.6
BH CV STRESS METS STAGE 2: 7
BH CV STRESS METS STAGE 3: 9
BH CV STRESS PROTOCOL 1: NORMAL
BH CV STRESS RECOVERY BP: NORMAL MMHG
BH CV STRESS RECOVERY HR: 75 BPM
BH CV STRESS RECOVERY O2: 95 %
BH CV STRESS SPEED STAGE 1: 1.7
BH CV STRESS SPEED STAGE 2: 2.5
BH CV STRESS SPEED STAGE 3: 3.4
BH CV STRESS STAGE 1: 1
BH CV STRESS STAGE 2: 2
BH CV STRESS STAGE 3: 3
LV EF NUC BP: 60 %
MAXIMAL PREDICTED HEART RATE: 148 BPM
PERCENT MAX PREDICTED HR: 87.84 %
STRESS BASELINE BP: NORMAL MMHG
STRESS BASELINE HR: 61 BPM
STRESS O2 SAT REST: 96 %
STRESS PERCENT HR: 103 %
STRESS POST ESTIMATED WORKLOAD: 9 METS
STRESS POST EXERCISE DUR MIN: 7 MIN
STRESS POST EXERCISE DUR SEC: 42 SEC
STRESS POST PEAK BP: NORMAL MMHG
STRESS POST PEAK HR: 130 BPM
STRESS TARGET HR: 126 BPM

## 2019-11-18 PROCEDURE — 78452 HT MUSCLE IMAGE SPECT MULT: CPT

## 2019-11-18 PROCEDURE — 93016 CV STRESS TEST SUPVJ ONLY: CPT | Performed by: INTERNAL MEDICINE

## 2019-11-18 PROCEDURE — 0 TECHNETIUM SESTAMIBI: Performed by: INTERNAL MEDICINE

## 2019-11-18 PROCEDURE — 78452 HT MUSCLE IMAGE SPECT MULT: CPT | Performed by: INTERNAL MEDICINE

## 2019-11-18 PROCEDURE — A9500 TC99M SESTAMIBI: HCPCS | Performed by: INTERNAL MEDICINE

## 2019-11-18 PROCEDURE — 93017 CV STRESS TEST TRACING ONLY: CPT

## 2019-11-18 PROCEDURE — 93018 CV STRESS TEST I&R ONLY: CPT | Performed by: INTERNAL MEDICINE

## 2019-11-18 RX ORDER — OMEGA-3-ACID ETHYL ESTERS 1 G/1
CAPSULE, LIQUID FILLED ORAL
COMMUNITY
Start: 2019-11-14 | End: 2019-11-18 | Stop reason: SDUPTHER

## 2019-11-18 RX ORDER — DICYCLOMINE HYDROCHLORIDE 10 MG/1
10 CAPSULE ORAL
COMMUNITY

## 2019-11-18 RX ORDER — THIAMINE HCL 100 MG
TABLET ORAL
COMMUNITY
End: 2022-12-01 | Stop reason: SDUPTHER

## 2019-11-18 RX ADMIN — TECHNETIUM TC 99M SESTAMIBI 1 DOSE: 1 INJECTION INTRAVENOUS at 10:33

## 2019-11-18 RX ADMIN — TECHNETIUM TC 99M SESTAMIBI 1 DOSE: 1 INJECTION INTRAVENOUS at 07:51

## 2019-12-02 ENCOUNTER — OFFICE VISIT (OUTPATIENT)
Dept: CARDIOLOGY | Facility: CLINIC | Age: 74
End: 2019-12-02

## 2019-12-02 VITALS
SYSTOLIC BLOOD PRESSURE: 126 MMHG | HEIGHT: 62 IN | DIASTOLIC BLOOD PRESSURE: 79 MMHG | WEIGHT: 148 LBS | HEART RATE: 60 BPM | BODY MASS INDEX: 27.23 KG/M2

## 2019-12-02 DIAGNOSIS — I10 ESSENTIAL HYPERTENSION: ICD-10-CM

## 2019-12-02 DIAGNOSIS — R94.31 ABNORMAL EKG: Primary | ICD-10-CM

## 2019-12-02 DIAGNOSIS — E78.5 HYPERLIPIDEMIA, UNSPECIFIED HYPERLIPIDEMIA TYPE: ICD-10-CM

## 2019-12-02 PROCEDURE — 99212 OFFICE O/P EST SF 10 MIN: CPT | Performed by: INTERNAL MEDICINE

## 2019-12-02 NOTE — PROGRESS NOTES
Subjective:        Karly Nash is a 73 y.o. female who here for follow up    CC  Follow up after stress test and echocardiogram  HPI  73-year-old female here for the follow-up after the stress test and echocardiogram done recently with known history of benign essential arterial hypertension hyperlipidemia and abnormal EKG     Problem List Items Addressed This Visit        Cardiovascular and Mediastinum    Essential hypertension    Hyperlipidemia    Abnormal EKG - Primary        .Interpretation Summary        · Moderate risk for ischemic heart disease.  · Findings consistent with an abnormal ECG stress test.  · Left ventricular ejection fraction is normal (Calculated EF = 60%).  · Myocardial perfusion imaging indicates a normal myocardial perfusion study with no evidence of ischemia.  · Impressions are consistent with a low risk study.     Interpretation Summary     · Left atrial cavity size is mildly dilated.  · Calculated EF = 65%  · There is no evidence of pericardial effusion         The following portions of the patient's history were reviewed and updated as appropriate: allergies, current medications, past family history, past medical history, past social history, past surgical history and problem list.    Past Medical History:   Diagnosis Date   • Bloating    • Blood in stool    • Diverticulosis    • Exercise-induced reactive airway disease    • Family history of colonic polyps    • Flatulence    • Gas pain    • GERD (gastroesophageal reflux disease)    • History of CT scan of abdomen    • HTN (hypertension)    • IBS (irritable bowel syndrome)    • Osteoarthritis      reports that she quit smoking about 32 years ago. Her smoking use included cigarettes. She has never used smokeless tobacco. She reports that she drinks about 4.2 oz of alcohol per week. She reports that she does not use drugs.   Family History   Problem Relation Age of Onset   • Hypertension Mother    • Hyperlipidemia Mother    • Hypertension  "Father    • Hyperlipidemia Father    • Colon polyps Brother    • Diabetes Brother    • Hypertension Brother    • Hyperlipidemia Brother    • Cancer Paternal Grandmother         esophageal   • Crohn's disease Other    • Heart failure Maternal Aunt        Review of Systems  Constitutional: No wt loss, fever, fatigue  Gastrointestinal: No nausea, abdominal pain  Behavioral/Psych: No insomnia or anxiety   Cardiovascular no chest pains or tightness no chest  Objective:       Physical Exam  /79   Pulse 60   Ht 157.5 cm (62\")   Wt 67.1 kg (148 lb)   BMI 27.07 kg/m²   General appearance: No acute changes   Neck: Trachea midline; NECK, supple, no thyromegaly or lymphadenopathy   Lungs: Normal size and shape, normal breath sounds, equal distribution of air, no rales and rhonchi   CV: S1-S2 regular, no murmurs, no rub, no gallop   Abdomen: Soft, non-tender; no masses , no abnormal abdominal sounds   Extremities: No deformity , normal color , no peripheral edema   Skin: Normal temperature, turgor and texture; no rash, ulcers          Procedures      Echocardiogram:        Current Outpatient Medications:   •  acetaminophen (TYLENOL) 500 MG tablet, Take 500 mg by mouth Every 6 (Six) Hours As Needed for mild pain (1-3)., Disp: , Rfl:   •  ALPRAZolam (XANAX) 0.25 MG tablet, Take 0.25 mg by mouth As Needed for Anxiety., Disp: , Rfl:   •  ascorbic acid (VITAMIN C) 500 MG tablet, Take 500 mg by mouth daily., Disp: , Rfl:   •  aspirin 81 MG tablet, Take 81 mg by mouth daily., Disp: , Rfl:   •  Calcium Carb-Cholecalciferol (CALCIUM 500 +D PO), Take  by mouth., Disp: , Rfl:   •  cetirizine (ZyrTEC) 10 MG tablet, Take 10 mg by mouth daily as needed for allergies., Disp: , Rfl:   •  Cholecalciferol (VITAMIN D-3) 1000 UNITS capsule, Take by mouth daily., Disp: , Rfl:   •  Coenzyme Q-10 200 MG capsule, Take  by mouth., Disp: , Rfl:   •  cyclobenzaprine (FLEXERIL) 10 MG tablet, Take 10 mg by mouth As Needed for Muscle Spasms., Disp: " , Rfl:   •  dicyclomine (BENTYL) 10 MG capsule, Take 10 mg by mouth 4 (Four) Times a Day Before Meals & at Bedtime., Disp: , Rfl:   •  estradiol (VAGIFEM) 10 MCG tablet vaginal tablet, Insert 1 tablet into the vagina 2 (two) times a week., Disp: , Rfl:   •  Garlic (GARLIQUE PO), Take  by mouth., Disp: , Rfl:   •  glucosamine-chondroitin 500-400 MG per tablet, Take 1 tablet by mouth 2 (two) times a day., Disp: , Rfl:   •  guaiFENesin (MUCINEX) 600 MG 12 hr tablet, Take 1,200 mg by mouth 2 (Two) Times a Day., Disp: , Rfl:   •  hydrocortisone (ANUSOL-HC) 2.5 % rectal cream, Insert  into the rectum 2 (Two) Times a Day., Disp: , Rfl:   •  Magnesium 500 MG tablet, Take  by mouth., Disp: , Rfl:   •  nebivolol (BYSTOLIC) 20 MG tablet, Take 20 mg by mouth daily., Disp: , Rfl:   •  Omega-3-acid Ethyl Esters (LOVAZA PO), Take 1 g by mouth Daily., Disp: , Rfl:   •  omeprazole (PriLOSEC) 20 MG capsule, Take 20 mg by mouth Daily., Disp: , Rfl:   •  oyster shell calcium 500 MG tablet tablet, Take  by mouth 2 (Two) Times a Day., Disp: , Rfl:   •  rosuvastatin (CRESTOR) 20 MG tablet, Take 10 mg by mouth Daily., Disp: , Rfl:   •  simethicone (MYLICON) 80 MG chewable tablet, Chew 80 mg Every 6 (Six) Hours As Needed for flatulence., Disp: , Rfl:   •  telmisartan (MICARDIS) 40 MG tablet, Take 40 mg by mouth Daily., Disp: , Rfl:   •  Triamcinolone Acetonide (NASACORT ALLERGY 24HR NA), into each nostril., Disp: , Rfl:   •  VIBERZI 75 MG tablet, , Disp: , Rfl:   •  vitamin B-12 (CYANOCOBALAMIN) 1000 MCG tablet, Take 1,000 mcg by mouth Daily., Disp: , Rfl:   •  vitamin B-6 (PYRIDOXINE) 50 MG tablet, Take 50 mg by mouth daily., Disp: , Rfl:    Assessment:        Patient Active Problem List   Diagnosis   • Essential hypertension   • Hyperlipidemia   • Abnormal EKG               Plan:            ICD-10-CM ICD-9-CM   1. Abnormal EKG R94.31 794.31   2. Essential hypertension I10 401.9   3. Hyperlipidemia, unspecified hyperlipidemia type E78.5  272.4     1. Abnormal EKG  The work-up is negative    2. Essential hypertension  Blood pressure stable    3. Hyperlipidemia, unspecified hyperlipidemia type  Continue current medications       Specificity and sensitivity of the stress test/ cardiac workup has been explained. Pt has been explained if  Symptoms continue please go to ER, and further w/p will be required.    Also explained this does not rule out coronary artery disease or the future events, continue to emphasize on risk reductions for coronary artery disease    Pt also advised to contact PCP for other causes of symptoms    See in 1 yr   COUNSELING:    Karly OlveratCounseling was given to patient for the following topics: diagnostic results, risk factor reductions, impressions, risks and benefits of treatment options and importance of treatment compliance .       SMOKING COUNSELING:    [unfilled]    Dictated using Dragon dictation

## 2020-01-15 ENCOUNTER — OFFICE (OUTPATIENT)
Dept: URBAN - METROPOLITAN AREA CLINIC 75 | Facility: CLINIC | Age: 75
End: 2020-01-15

## 2020-01-15 VITALS
DIASTOLIC BLOOD PRESSURE: 71 MMHG | HEIGHT: 62 IN | SYSTOLIC BLOOD PRESSURE: 122 MMHG | HEART RATE: 69 BPM | WEIGHT: 145 LBS

## 2020-01-15 DIAGNOSIS — Z79.82 LONG TERM (CURRENT) USE OF ASPIRIN: ICD-10-CM

## 2020-01-15 DIAGNOSIS — Z83.71 FAMILY HISTORY OF COLONIC POLYPS: ICD-10-CM

## 2020-01-15 DIAGNOSIS — K57.32 DIVERTICULITIS OF LARGE INTESTINE WITHOUT PERFORATION OR ABS: ICD-10-CM

## 2020-01-15 DIAGNOSIS — R10.13 EPIGASTRIC PAIN: ICD-10-CM

## 2020-01-15 DIAGNOSIS — K58.0 IRRITABLE BOWEL SYNDROME WITH DIARRHEA: ICD-10-CM

## 2020-01-15 DIAGNOSIS — K21.9 GASTRO-ESOPHAGEAL REFLUX DISEASE WITHOUT ESOPHAGITIS: ICD-10-CM

## 2020-01-15 PROCEDURE — 99213 OFFICE O/P EST LOW 20 MIN: CPT | Performed by: INTERNAL MEDICINE

## 2020-01-15 RX ORDER — METRONIDAZOLE 500 MG/1
1500 TABLET, FILM COATED ORAL
Qty: 30 | Refills: 0 | Status: COMPLETED
Start: 2020-01-15 | End: 2020-07-15

## 2020-01-15 RX ORDER — ELUXADOLINE 75 MG/1
150 TABLET, FILM COATED ORAL
Qty: 180 | Refills: 2 | Status: ACTIVE
Start: 2018-06-08

## 2020-07-15 ENCOUNTER — OFFICE (OUTPATIENT)
Dept: URBAN - METROPOLITAN AREA CLINIC 75 | Facility: CLINIC | Age: 75
End: 2020-07-15

## 2020-07-15 VITALS — HEIGHT: 62 IN

## 2020-07-15 DIAGNOSIS — Z83.71 FAMILY HISTORY OF COLONIC POLYPS: ICD-10-CM

## 2020-07-15 DIAGNOSIS — K21.9 GASTRO-ESOPHAGEAL REFLUX DISEASE WITHOUT ESOPHAGITIS: ICD-10-CM

## 2020-07-15 DIAGNOSIS — Z79.82 LONG TERM (CURRENT) USE OF ASPIRIN: ICD-10-CM

## 2020-07-15 DIAGNOSIS — K58.0 IRRITABLE BOWEL SYNDROME WITH DIARRHEA: ICD-10-CM

## 2020-07-15 DIAGNOSIS — K64.9 UNSPECIFIED HEMORRHOIDS: ICD-10-CM

## 2020-07-15 DIAGNOSIS — K57.30 DIVERTICULOSIS OF LARGE INTESTINE WITHOUT PERFORATION OR ABS: ICD-10-CM

## 2020-07-15 DIAGNOSIS — K57.32 DIVERTICULITIS OF LARGE INTESTINE WITHOUT PERFORATION OR ABS: ICD-10-CM

## 2020-07-15 DIAGNOSIS — Z12.11 ENCOUNTER FOR SCREENING FOR MALIGNANT NEOPLASM OF COLON: ICD-10-CM

## 2020-07-15 PROCEDURE — 99213 OFFICE O/P EST LOW 20 MIN: CPT | Performed by: INTERNAL MEDICINE

## 2020-07-17 ENCOUNTER — APPOINTMENT (OUTPATIENT)
Dept: WOMENS IMAGING | Facility: HOSPITAL | Age: 75
End: 2020-07-17

## 2020-07-17 PROCEDURE — 77063 BREAST TOMOSYNTHESIS BI: CPT | Performed by: RADIOLOGY

## 2020-07-17 PROCEDURE — 77067 SCR MAMMO BI INCL CAD: CPT | Performed by: RADIOLOGY

## 2020-08-21 ENCOUNTER — APPOINTMENT (OUTPATIENT)
Dept: WOMENS IMAGING | Facility: HOSPITAL | Age: 75
End: 2020-08-21

## 2020-08-21 PROCEDURE — G0279 TOMOSYNTHESIS, MAMMO: HCPCS | Performed by: RADIOLOGY

## 2020-08-21 PROCEDURE — 76641 ULTRASOUND BREAST COMPLETE: CPT | Performed by: RADIOLOGY

## 2020-08-21 PROCEDURE — 77065 DX MAMMO INCL CAD UNI: CPT | Performed by: RADIOLOGY

## 2020-09-23 VITALS
SYSTOLIC BLOOD PRESSURE: 96 MMHG | HEART RATE: 64 BPM | HEART RATE: 59 BPM | HEIGHT: 62 IN | SYSTOLIC BLOOD PRESSURE: 94 MMHG | SYSTOLIC BLOOD PRESSURE: 115 MMHG | DIASTOLIC BLOOD PRESSURE: 52 MMHG | SYSTOLIC BLOOD PRESSURE: 86 MMHG | SYSTOLIC BLOOD PRESSURE: 128 MMHG | RESPIRATION RATE: 13 BRPM | SYSTOLIC BLOOD PRESSURE: 164 MMHG | DIASTOLIC BLOOD PRESSURE: 77 MMHG | OXYGEN SATURATION: 100 % | HEART RATE: 62 BPM | RESPIRATION RATE: 2 BRPM | OXYGEN SATURATION: 92 % | TEMPERATURE: 97.4 F | DIASTOLIC BLOOD PRESSURE: 60 MMHG | DIASTOLIC BLOOD PRESSURE: 37 MMHG | DIASTOLIC BLOOD PRESSURE: 45 MMHG | SYSTOLIC BLOOD PRESSURE: 85 MMHG | HEART RATE: 71 BPM | RESPIRATION RATE: 14 BRPM | SYSTOLIC BLOOD PRESSURE: 157 MMHG | HEART RATE: 65 BPM | DIASTOLIC BLOOD PRESSURE: 61 MMHG | HEART RATE: 66 BPM | DIASTOLIC BLOOD PRESSURE: 64 MMHG | RESPIRATION RATE: 5 BRPM | OXYGEN SATURATION: 96 % | TEMPERATURE: 96.4 F | HEART RATE: 60 BPM | SYSTOLIC BLOOD PRESSURE: 172 MMHG | RESPIRATION RATE: 8 BRPM | SYSTOLIC BLOOD PRESSURE: 102 MMHG | OXYGEN SATURATION: 97 % | HEART RATE: 69 BPM | DIASTOLIC BLOOD PRESSURE: 89 MMHG | DIASTOLIC BLOOD PRESSURE: 86 MMHG | RESPIRATION RATE: 6 BRPM | HEART RATE: 78 BPM | RESPIRATION RATE: 12 BRPM | SYSTOLIC BLOOD PRESSURE: 98 MMHG | DIASTOLIC BLOOD PRESSURE: 57 MMHG | HEART RATE: 61 BPM | HEART RATE: 63 BPM | SYSTOLIC BLOOD PRESSURE: 165 MMHG | DIASTOLIC BLOOD PRESSURE: 48 MMHG | SYSTOLIC BLOOD PRESSURE: 89 MMHG | SYSTOLIC BLOOD PRESSURE: 75 MMHG | DIASTOLIC BLOOD PRESSURE: 47 MMHG | DIASTOLIC BLOOD PRESSURE: 84 MMHG | DIASTOLIC BLOOD PRESSURE: 54 MMHG | OXYGEN SATURATION: 98 % | SYSTOLIC BLOOD PRESSURE: 107 MMHG | OXYGEN SATURATION: 93 % | OXYGEN SATURATION: 99 % | DIASTOLIC BLOOD PRESSURE: 41 MMHG | WEIGHT: 145 LBS

## 2020-09-24 ENCOUNTER — OFFICE (OUTPATIENT)
Dept: URBAN - METROPOLITAN AREA LAB 2 | Facility: LAB | Age: 75
End: 2020-09-24

## 2020-09-24 DIAGNOSIS — Z20.828 CONTACT WITH AND (SUSPECTED) EXPOSURE TO OTHER VIRAL COMMUNI: ICD-10-CM

## 2020-09-24 PROCEDURE — U0002 COVID-19 LAB TEST NON-CDC: HCPCS | Performed by: INTERNAL MEDICINE

## 2020-09-28 ENCOUNTER — AMBULATORY SURGICAL CENTER (OUTPATIENT)
Dept: URBAN - METROPOLITAN AREA SURGERY 17 | Facility: SURGERY | Age: 75
End: 2020-09-28

## 2020-09-28 ENCOUNTER — OFFICE (OUTPATIENT)
Dept: URBAN - METROPOLITAN AREA PATHOLOGY 4 | Facility: PATHOLOGY | Age: 75
End: 2020-09-28

## 2020-09-28 DIAGNOSIS — K21.9 GASTRO-ESOPHAGEAL REFLUX DISEASE WITHOUT ESOPHAGITIS: ICD-10-CM

## 2020-09-28 DIAGNOSIS — Z12.11 ENCOUNTER FOR SCREENING FOR MALIGNANT NEOPLASM OF COLON: ICD-10-CM

## 2020-09-28 DIAGNOSIS — K31.89 OTHER DISEASES OF STOMACH AND DUODENUM: ICD-10-CM

## 2020-09-28 DIAGNOSIS — K57.30 DIVERTICULOSIS OF LARGE INTESTINE WITHOUT PERFORATION OR ABS: ICD-10-CM

## 2020-09-28 DIAGNOSIS — K44.9 DIAPHRAGMATIC HERNIA WITHOUT OBSTRUCTION OR GANGRENE: ICD-10-CM

## 2020-09-28 DIAGNOSIS — K31.7 POLYP OF STOMACH AND DUODENUM: ICD-10-CM

## 2020-09-28 DIAGNOSIS — K22.8 OTHER SPECIFIED DISEASES OF ESOPHAGUS: ICD-10-CM

## 2020-09-28 DIAGNOSIS — K22.70 BARRETT'S ESOPHAGUS WITHOUT DYSPLASIA: ICD-10-CM

## 2020-09-28 DIAGNOSIS — R10.13 EPIGASTRIC PAIN: ICD-10-CM

## 2020-09-28 DIAGNOSIS — K29.70 GASTRITIS, UNSPECIFIED, WITHOUT BLEEDING: ICD-10-CM

## 2020-09-28 LAB
GI HISTOLOGY: A. UNSPECIFIED: (no result)
GI HISTOLOGY: B. UNSPECIFIED: (no result)
GI HISTOLOGY: PDF REPORT: (no result)

## 2020-09-28 PROCEDURE — G0121 COLON CA SCRN NOT HI RSK IND: HCPCS | Performed by: INTERNAL MEDICINE

## 2020-09-28 PROCEDURE — 88305 TISSUE EXAM BY PATHOLOGIST: CPT | Performed by: INTERNAL MEDICINE

## 2020-09-28 PROCEDURE — 43239 EGD BIOPSY SINGLE/MULTIPLE: CPT | Performed by: INTERNAL MEDICINE

## 2020-10-13 ENCOUNTER — OFFICE (OUTPATIENT)
Dept: URBAN - METROPOLITAN AREA CLINIC 75 | Facility: CLINIC | Age: 75
End: 2020-10-13

## 2020-10-13 VITALS — HEIGHT: 62 IN | WEIGHT: 140 LBS

## 2020-10-13 DIAGNOSIS — K57.30 DIVERTICULOSIS OF LARGE INTESTINE WITHOUT PERFORATION OR ABS: ICD-10-CM

## 2020-10-13 DIAGNOSIS — K21.9 GASTRO-ESOPHAGEAL REFLUX DISEASE WITHOUT ESOPHAGITIS: ICD-10-CM

## 2020-10-13 DIAGNOSIS — K31.89 OTHER DISEASES OF STOMACH AND DUODENUM: ICD-10-CM

## 2020-10-13 DIAGNOSIS — K58.0 IRRITABLE BOWEL SYNDROME WITH DIARRHEA: ICD-10-CM

## 2020-10-13 DIAGNOSIS — Z79.82 LONG TERM (CURRENT) USE OF ASPIRIN: ICD-10-CM

## 2020-10-13 DIAGNOSIS — Z11.59 ENCOUNTER FOR SCREENING FOR OTHER VIRAL DISEASES: ICD-10-CM

## 2020-10-13 DIAGNOSIS — K57.32 DIVERTICULITIS OF LARGE INTESTINE WITHOUT PERFORATION OR ABS: ICD-10-CM

## 2020-10-13 DIAGNOSIS — Z83.71 FAMILY HISTORY OF COLONIC POLYPS: ICD-10-CM

## 2020-10-13 DIAGNOSIS — K64.9 UNSPECIFIED HEMORRHOIDS: ICD-10-CM

## 2020-10-13 DIAGNOSIS — K22.8 OTHER SPECIFIED DISEASES OF ESOPHAGUS: ICD-10-CM

## 2020-10-13 DIAGNOSIS — R10.13 EPIGASTRIC PAIN: ICD-10-CM

## 2020-10-13 DIAGNOSIS — K44.9 DIAPHRAGMATIC HERNIA WITHOUT OBSTRUCTION OR GANGRENE: ICD-10-CM

## 2020-10-13 PROCEDURE — 99214 OFFICE O/P EST MOD 30 MIN: CPT | Performed by: NURSE PRACTITIONER

## 2020-11-05 ENCOUNTER — OFFICE VISIT (OUTPATIENT)
Dept: CARDIOLOGY | Facility: CLINIC | Age: 75
End: 2020-11-05

## 2020-11-05 VITALS
DIASTOLIC BLOOD PRESSURE: 77 MMHG | BODY MASS INDEX: 25.76 KG/M2 | HEART RATE: 59 BPM | SYSTOLIC BLOOD PRESSURE: 179 MMHG | HEIGHT: 62 IN | WEIGHT: 140 LBS

## 2020-11-05 DIAGNOSIS — R94.31 ABNORMAL EKG: Primary | ICD-10-CM

## 2020-11-05 DIAGNOSIS — E78.5 HYPERLIPIDEMIA, UNSPECIFIED HYPERLIPIDEMIA TYPE: ICD-10-CM

## 2020-11-05 DIAGNOSIS — I10 ESSENTIAL HYPERTENSION: ICD-10-CM

## 2020-11-05 PROCEDURE — 99213 OFFICE O/P EST LOW 20 MIN: CPT | Performed by: INTERNAL MEDICINE

## 2020-11-05 PROCEDURE — 93000 ELECTROCARDIOGRAM COMPLETE: CPT | Performed by: INTERNAL MEDICINE

## 2020-11-05 RX ORDER — LANOLIN ALCOHOL/MO/W.PET/CERES
1 CREAM (GRAM) TOPICAL DAILY
COMMUNITY

## 2020-11-05 NOTE — PROGRESS NOTES
1 yr follow up    Subjective:        Karly Nash is a 74 y.o. female who here for follow up    CC  Follow-up hypertension hyperlipidemia abnormal EKG  HPI  74-year-old female with known history of the benign essential arterial hypertension, hyperlipidemia as well as abnormal EKG here for the follow-up with no complaints of chest pains tightness heaviness or the pressure sensation     Problems Addressed this Visit        Cardiovascular and Mediastinum    Essential hypertension    Hyperlipidemia    Abnormal EKG - Primary      Diagnoses       Codes Comments    Abnormal EKG    -  Primary ICD-10-CM: R94.31  ICD-9-CM: 794.31     Essential hypertension     ICD-10-CM: I10  ICD-9-CM: 401.9     Hyperlipidemia, unspecified hyperlipidemia type     ICD-10-CM: E78.5  ICD-9-CM: 272.4         .    The following portions of the patient's history were reviewed and updated as appropriate: allergies, current medications, past family history, past medical history, past social history, past surgical history and problem list.    Past Medical History:   Diagnosis Date   • Bloating    • Blood in stool    • Diverticulosis    • Exercise-induced reactive airway disease    • Family history of colonic polyps    • Flatulence    • Gas pain    • GERD (gastroesophageal reflux disease)    • History of CT scan of abdomen    • HTN (hypertension)    • IBS (irritable bowel syndrome)    • Osteoarthritis      reports that she quit smoking about 33 years ago. Her smoking use included cigarettes. She has never used smokeless tobacco. She reports current alcohol use of about 7.0 standard drinks of alcohol per week. She reports that she does not use drugs.   Family History   Problem Relation Age of Onset   • Hypertension Mother    • Hyperlipidemia Mother    • Hypertension Father    • Hyperlipidemia Father    • Colon polyps Brother    • Diabetes Brother    • Hypertension Brother    • Hyperlipidemia Brother    • Cancer Paternal Grandmother         esophageal  "  • Crohn's disease Other    • Heart failure Maternal Aunt        Review of Systems  Constitutional: No wt loss, fever, fatigue  Gastrointestinal: No nausea, abdominal pain  Behavioral/Psych: No insomnia or anxiety   Cardiovascular no chest pains or tightness in the chest  Objective:       Physical Exam  /77 (BP Location: Right arm)   Pulse 59   Ht 157.5 cm (62\")   Wt 63.5 kg (140 lb)   BMI 25.61 kg/m²   General appearance: No acute changes   Neck: Trachea midline; NECK, supple, no thyromegaly or lymphadenopathy   Lungs: Normal size and shape, normal breath sounds, equal distribution of air, no rales and rhonchi   CV: S1-S2 regular, no murmurs, no rub, no gallop   Abdomen: Soft, non-tender; no masses , no abnormal abdominal sounds   Extremities: No deformity , normal color , no peripheral edema   Skin: Normal temperature, turgor and texture; no rash, ulcers            ECG 12 Lead    Date/Time: 11/5/2020 10:53 AM  Performed by: Zaid Hargrove MD  Authorized by: Zaid Hargrove MD   Comparison: compared with previous ECG   Similar to previous ECG  Rhythm: sinus rhythm  ST Flattening: all    Clinical impression: non-specific ECG              Echocardiogram:        Current Outpatient Medications:   •  acetaminophen (TYLENOL) 500 MG tablet, Take 500 mg by mouth Every 6 (Six) Hours As Needed for mild pain (1-3)., Disp: , Rfl:   •  ALPRAZolam (XANAX) 0.25 MG tablet, Take 0.25 mg by mouth As Needed for Anxiety., Disp: , Rfl:   •  ascorbic acid (VITAMIN C) 500 MG tablet, Take 500 mg by mouth daily., Disp: , Rfl:   •  aspirin 81 MG tablet, Take 81 mg by mouth daily., Disp: , Rfl:   •  Calcium Carb-Cholecalciferol (CALCIUM 500 +D PO), Take  by mouth., Disp: , Rfl:   •  cetirizine (ZyrTEC) 10 MG tablet, Take 10 mg by mouth daily as needed for allergies., Disp: , Rfl:   •  Cholecalciferol (VITAMIN D-3) 1000 UNITS capsule, Take by mouth daily., Disp: , Rfl:   •  Coenzyme Q-10 200 MG capsule, Take  by " mouth., Disp: , Rfl:   •  cyclobenzaprine (FLEXERIL) 10 MG tablet, Take 10 mg by mouth As Needed for Muscle Spasms., Disp: , Rfl:   •  dicyclomine (BENTYL) 10 MG capsule, Take 10 mg by mouth 4 (Four) Times a Day Before Meals & at Bedtime., Disp: , Rfl:   •  estradiol (VAGIFEM) 10 MCG tablet vaginal tablet, Insert 1 tablet into the vagina 2 (two) times a week., Disp: , Rfl:   •  Garlic (GARLIQUE PO), Take  by mouth., Disp: , Rfl:   •  glucosamine-chondroitin 500-400 MG per tablet, Take 1 tablet by mouth 2 (two) times a day., Disp: , Rfl:   •  guaiFENesin (MUCINEX) 600 MG 12 hr tablet, Take 1,200 mg by mouth 2 (Two) Times a Day., Disp: , Rfl:   •  hydrocortisone (ANUSOL-HC) 2.5 % rectal cream, Insert  into the rectum 2 (Two) Times a Day., Disp: , Rfl:   •  Magnesium 500 MG tablet, Take  by mouth., Disp: , Rfl:   •  Magnesium Oxide 400 (240 Mg) MG tablet, Take 1 tablet by mouth Daily., Disp: , Rfl:   •  nebivolol (BYSTOLIC) 20 MG tablet, Take 20 mg by mouth daily., Disp: , Rfl:   •  Omega-3-acid Ethyl Esters (LOVAZA PO), Take 1 g by mouth Daily., Disp: , Rfl:   •  omeprazole (PriLOSEC) 20 MG capsule, Take 20 mg by mouth 2 (two) times a day., Disp: , Rfl:   •  oyster shell calcium 500 MG tablet tablet, Take  by mouth 2 (Two) Times a Day., Disp: , Rfl:   •  rosuvastatin (CRESTOR) 20 MG tablet, Take 10 mg by mouth Daily., Disp: , Rfl:   •  simethicone (MYLICON) 80 MG chewable tablet, Chew 80 mg Every 6 (Six) Hours As Needed for flatulence., Disp: , Rfl:   •  telmisartan (MICARDIS) 40 MG tablet, Take 40 mg by mouth Daily., Disp: , Rfl:   •  Triamcinolone Acetonide (NASACORT ALLERGY 24HR NA), into each nostril., Disp: , Rfl:   •  VIBERZI 75 MG tablet, , Disp: , Rfl:   •  vitamin B-12 (CYANOCOBALAMIN) 1000 MCG tablet, Take 1,000 mcg by mouth Daily., Disp: , Rfl:   •  vitamin B-6 (PYRIDOXINE) 50 MG tablet, Take 50 mg by mouth daily., Disp: , Rfl:    Assessment:        Patient Active Problem List   Diagnosis   • Essential  hypertension   • Hyperlipidemia   • Abnormal EKG               Plan:            ICD-10-CM ICD-9-CM   1. Abnormal EKG  R94.31 794.31   2. Essential hypertension  I10 401.9   3. Hyperlipidemia, unspecified hyperlipidemia type  E78.5 272.4     1. Abnormal EKG  Continue current treatment    2. Essential hypertension  Blood pressure under control, much better at home    3. Hyperlipidemia, unspecified hyperlipidemia type  Continue current treatment  bp better at home  See in 1 yr  COUNSELING:    Karly TriplettCounseling was given to patient for the following topics: diagnostic results, risk factor reductions, impressions, risks and benefits of treatment options and importance of treatment compliance .       SMOKING COUNSELING:    [unfilled]    Dictated using Dragon dictation

## 2021-03-09 DIAGNOSIS — Z23 IMMUNIZATION DUE: ICD-10-CM

## 2021-04-07 ENCOUNTER — OFFICE (OUTPATIENT)
Dept: URBAN - METROPOLITAN AREA CLINIC 75 | Facility: CLINIC | Age: 76
End: 2021-04-07

## 2021-04-07 VITALS
TEMPERATURE: 97 F | OXYGEN SATURATION: 98 % | SYSTOLIC BLOOD PRESSURE: 131 MMHG | HEART RATE: 66 BPM | DIASTOLIC BLOOD PRESSURE: 79 MMHG | WEIGHT: 142 LBS | HEIGHT: 62 IN

## 2021-04-07 DIAGNOSIS — Z83.71 FAMILY HISTORY OF COLONIC POLYPS: ICD-10-CM

## 2021-04-07 DIAGNOSIS — K58.0 IRRITABLE BOWEL SYNDROME WITH DIARRHEA: ICD-10-CM

## 2021-04-07 PROBLEM — K22.8 OTHER SPECIFIED DISEASES OF ESOPHAGUS: Status: INACTIVE | Noted: 2020-09-28

## 2021-04-07 PROBLEM — K57.30 DVRTCLOS OF LG INT W/O PERFORATION OR ABSCESS W/O BLEEDING: Status: INACTIVE | Noted: 2020-09-28

## 2021-04-07 PROBLEM — K31.89 OTHER DISEASES OF STOMACH AND DUODENUM: Status: INACTIVE | Noted: 2020-09-28

## 2021-04-07 PROCEDURE — 99213 OFFICE O/P EST LOW 20 MIN: CPT | Performed by: INTERNAL MEDICINE

## 2021-08-24 ENCOUNTER — APPOINTMENT (OUTPATIENT)
Dept: WOMENS IMAGING | Facility: HOSPITAL | Age: 76
End: 2021-08-24

## 2021-08-24 PROCEDURE — 77063 BREAST TOMOSYNTHESIS BI: CPT | Performed by: RADIOLOGY

## 2021-08-24 PROCEDURE — 77067 SCR MAMMO BI INCL CAD: CPT | Performed by: RADIOLOGY

## 2021-10-20 ENCOUNTER — OFFICE (OUTPATIENT)
Dept: URBAN - METROPOLITAN AREA CLINIC 75 | Facility: CLINIC | Age: 76
End: 2021-10-20

## 2021-10-20 VITALS
HEIGHT: 62 IN | WEIGHT: 143.2 LBS | OXYGEN SATURATION: 96 % | DIASTOLIC BLOOD PRESSURE: 64 MMHG | SYSTOLIC BLOOD PRESSURE: 132 MMHG | HEART RATE: 98 BPM

## 2021-10-20 DIAGNOSIS — R19.7 DIARRHEA, UNSPECIFIED: ICD-10-CM

## 2021-10-20 DIAGNOSIS — K92.1 MELENA: ICD-10-CM

## 2021-10-20 PROCEDURE — 99213 OFFICE O/P EST LOW 20 MIN: CPT | Performed by: INTERNAL MEDICINE

## 2021-11-09 ENCOUNTER — OFFICE VISIT (OUTPATIENT)
Dept: CARDIOLOGY | Facility: CLINIC | Age: 76
End: 2021-11-09

## 2021-11-09 VITALS
HEIGHT: 62 IN | HEART RATE: 63 BPM | SYSTOLIC BLOOD PRESSURE: 134 MMHG | BODY MASS INDEX: 26.31 KG/M2 | OXYGEN SATURATION: 98 % | DIASTOLIC BLOOD PRESSURE: 82 MMHG | WEIGHT: 143 LBS

## 2021-11-09 DIAGNOSIS — E78.2 MIXED HYPERLIPIDEMIA: ICD-10-CM

## 2021-11-09 DIAGNOSIS — I10 PRIMARY HYPERTENSION: Primary | ICD-10-CM

## 2021-11-09 PROCEDURE — 93000 ELECTROCARDIOGRAM COMPLETE: CPT

## 2021-11-09 PROCEDURE — 99213 OFFICE O/P EST LOW 20 MIN: CPT

## 2021-11-09 RX ORDER — DENOSUMAB 60 MG/ML
60 INJECTION SUBCUTANEOUS
COMMUNITY

## 2021-11-09 RX ORDER — B-COMPLEX WITH VITAMIN C
TABLET ORAL
COMMUNITY

## 2021-11-09 NOTE — PROGRESS NOTES
Subjective:        Karly Nash is a 75 y.o. female who here for follow up    Chief Complaint   Patient presents with   • Follow-up     1 yr      Hypertension    HPI    This is a 75-year-old female, who is new to me. She has a history of GERD, hypertension, IBS. Stress test 2019 myocardial perfusion indicated normal study with no evidence of ischemia. Echo 2019 LAC mildly dilated, EF 65%, normal LV function.    She follows up today for management of hypertension. She denies any chest pain or shortness of breath. She denies any palpitations, syncope or near syncope.    The following portions of the patient's history were reviewed and updated as appropriate: allergies, current medications, past family history, past medical history, past social history, past surgical history and problem list.    Past Medical History:   Diagnosis Date   • Bloating    • Blood in stool    • Diverticulosis    • Exercise-induced reactive airway disease    • Family history of colonic polyps    • Flatulence    • Gas pain    • GERD (gastroesophageal reflux disease)    • History of CT scan of abdomen    • HTN (hypertension)    • IBS (irritable bowel syndrome)    • Osteoarthritis          reports that she quit smoking about 34 years ago. Her smoking use included cigarettes. She has never used smokeless tobacco. She reports current alcohol use of about 7.0 standard drinks of alcohol per week. She reports that she does not use drugs.     Family History   Problem Relation Age of Onset   • Hypertension Mother    • Hyperlipidemia Mother    • Hypertension Father    • Hyperlipidemia Father    • Colon polyps Brother    • Diabetes Brother    • Hypertension Brother    • Hyperlipidemia Brother    • Cancer Paternal Grandmother         esophageal   • Crohn's disease Other    • Heart failure Maternal Aunt        ROS     Review of Systems  Constitutional: No wt loss, fever, fatigue  Gastrointestinal: No nausea, abdominal pain  Behavioral/Psych: No insomnia or  anxiety  Cardiovascular no chest pain or shortness of breath      Objective:           Vitals and nursing note reviewed.   Constitutional:       Appearance: Well-developed.   HENT:      Head: Normocephalic.      Right Ear: External ear normal.      Left Ear: External ear normal.   Neck:      Vascular: No JVD.   Pulmonary:      Effort: Pulmonary effort is normal. No respiratory distress.      Breath sounds: Normal breath sounds. No stridor. No rales.   Cardiovascular:      Bradycardia present. Regular rhythm.      No gallop.   Pulses:     Intact distal pulses.   Abdominal:      General: Bowel sounds are normal. There is no distension.      Palpations: Abdomen is soft.      Tenderness: There is no abdominal tenderness. There is no guarding.   Musculoskeletal: Normal range of motion.         General: No tenderness.      Cervical back: Normal range of motion. Skin:     General: Skin is warm.   Neurological:      Mental Status: Alert and oriented to person, place, and time.      Deep Tendon Reflexes: Reflexes are normal and symmetric.   Psychiatric:         Judgment: Judgment normal.           ECG 12 Lead    Date/Time: 11/9/2021 10:32 AM  Performed by: Acacia Gonzáles APRN  Authorized by: Acacia Gonzáles APRN   Comparison: compared with previous ECG from 11/5/2020  Similar to previous ECG  Rhythm: sinus bradycardia  Rate: bradycardic  BPM: 55    Clinical impression: normal ECG                  Current Outpatient Medications:   •  acetaminophen (TYLENOL) 500 MG tablet, Take 500 mg by mouth Every 6 (Six) Hours As Needed for mild pain (1-3)., Disp: , Rfl:   •  ascorbic acid (VITAMIN C) 500 MG tablet, Take 500 mg by mouth daily., Disp: , Rfl:   •  aspirin 81 MG tablet, Take 81 mg by mouth daily., Disp: , Rfl:   •  Calcium Carb-Cholecalciferol (CALCIUM 500 +D PO), Take  by mouth., Disp: , Rfl:   •  cetirizine (ZyrTEC) 10 MG tablet, Take 10 mg by mouth daily as needed for allergies., Disp: , Rfl:   •  Cholecalciferol  (VITAMIN D-3) 1000 UNITS capsule, Take by mouth daily., Disp: , Rfl:   •  Coenzyme Q-10 200 MG capsule, Take  by mouth., Disp: , Rfl:   •  cyclobenzaprine (FLEXERIL) 10 MG tablet, Take 10 mg by mouth As Needed for Muscle Spasms., Disp: , Rfl:   •  dicyclomine (BENTYL) 10 MG capsule, Take 10 mg by mouth 4 (Four) Times a Day Before Meals & at Bedtime., Disp: , Rfl:   •  estradiol (VAGIFEM) 10 MCG tablet vaginal tablet, Insert 1 tablet into the vagina 2 (two) times a week., Disp: , Rfl:   •  FOLIC ACID PO, Take  by mouth., Disp: , Rfl:   •  Garlic (GARLIQUE PO), Take  by mouth., Disp: , Rfl:   •  glucosamine-chondroitin 500-400 MG per tablet, Take 1 tablet by mouth 2 (two) times a day., Disp: , Rfl:   •  guaiFENesin (MUCINEX) 600 MG 12 hr tablet, Take 1,200 mg by mouth 2 (Two) Times a Day., Disp: , Rfl:   •  Magnesium Oxide 400 (240 Mg) MG tablet, Take 1 tablet by mouth Daily., Disp: , Rfl:   •  nebivolol (BYSTOLIC) 20 MG tablet, Take 20 mg by mouth daily., Disp: , Rfl:   •  Omega-3-acid Ethyl Esters (LOVAZA PO), Take 1 g by mouth Daily., Disp: , Rfl:   •  omeprazole (PriLOSEC) 20 MG capsule, Take 20 mg by mouth 2 (two) times a day., Disp: , Rfl:   •  oyster shell calcium 500 MG tablet tablet, Take  by mouth 2 (Two) Times a Day., Disp: , Rfl:   •  rosuvastatin (CRESTOR) 20 MG tablet, Take 10 mg by mouth Daily., Disp: , Rfl:   •  simethicone (MYLICON) 80 MG chewable tablet, Chew 80 mg Every 6 (Six) Hours As Needed for flatulence., Disp: , Rfl:   •  telmisartan (MICARDIS) 40 MG tablet, Take 40 mg by mouth Daily., Disp: , Rfl:   •  VIBERZI 75 MG tablet, , Disp: , Rfl:   •  vitamin B-12 (CYANOCOBALAMIN) 1000 MCG tablet, Take 1,000 mcg by mouth Daily., Disp: , Rfl:   •  vitamin B-6 (PYRIDOXINE) 50 MG tablet, Take 50 mg by mouth daily., Disp: , Rfl:   •  ALPRAZolam (XANAX) 0.25 MG tablet, Take 0.25 mg by mouth As Needed for Anxiety., Disp: , Rfl:   •  denosumab (Prolia) 60 MG/ML solution prefilled syringe syringe, Inject 60  mg under the skin into the appropriate area as directed., Disp: , Rfl:   •  hydrocortisone (ANUSOL-HC) 2.5 % rectal cream, Insert  into the rectum 2 (Two) Times a Day., Disp: , Rfl:   •  Magnesium 500 MG tablet, Take  by mouth., Disp: , Rfl:   •  Triamcinolone Acetonide (NASACORT ALLERGY 24HR NA), into each nostril., Disp: , Rfl:   •  Zinc 100 MG tablet, Take  by mouth., Disp: , Rfl:      Assessment:        Patient Active Problem List   Diagnosis   • Essential hypertension   • Hyperlipidemia   • Abnormal EKG               Plan:   1. Hypertension: BP controlled. Continue Bystolic and telmisartan.    Importance of controlling hypertension and blood pressure  checkup on the regular basis has been explained. Hypertension as a silent killer has been discussed. Risk reduction of the weight and regular exercises to control the hypertension has been explained.    2. Hyperlipidemia: She reports her PCP manages her cholesterol labs. Continue statin.    Risk of the hyperlipidemia, importance of the treatment has been explained. Pros and cons of the statins has been explained. Regular blood workup as well as side effects including the liver failure, myelopathy death has been explained.               No diagnosis found.    There are no diagnoses linked to this encounter.    COUNSELING:done    Karly TriplettCounseling was given to patient for the following topics: diagnostic results, risk factor reductions, impressions, risks and benefits of treatment options and importance of treatment compliance .       SMOKING COUNSELING:denies    Follow up in 1 year or sooner if needed    Sincerely,   MARILIA Beth  [facility]  11/09/21  10:32 EST    EMR Dragon/Transcription disclaimer:   Much of this encounter note is an electronic transcription/translation of spoken language to printed text. The electronic translation of spoken language may permit erroneous, or at times, nonsensical words or phrases to be inadvertently  transcribed; Although I have reviewed the note for such errors, some may still exist.

## 2022-08-17 VITALS
OXYGEN SATURATION: 98 % | HEIGHT: 62 IN | DIASTOLIC BLOOD PRESSURE: 82 MMHG | SYSTOLIC BLOOD PRESSURE: 126 MMHG | HEART RATE: 64 BPM | WEIGHT: 138 LBS

## 2022-08-18 ENCOUNTER — OFFICE (OUTPATIENT)
Dept: URBAN - METROPOLITAN AREA CLINIC 75 | Facility: CLINIC | Age: 77
End: 2022-08-18

## 2022-08-18 DIAGNOSIS — Z79.82 LONG TERM (CURRENT) USE OF ASPIRIN: ICD-10-CM

## 2022-08-18 DIAGNOSIS — K64.9 UNSPECIFIED HEMORRHOIDS: ICD-10-CM

## 2022-08-18 DIAGNOSIS — K21.9 GASTRO-ESOPHAGEAL REFLUX DISEASE WITHOUT ESOPHAGITIS: ICD-10-CM

## 2022-08-18 DIAGNOSIS — K44.9 DIAPHRAGMATIC HERNIA WITHOUT OBSTRUCTION OR GANGRENE: ICD-10-CM

## 2022-08-18 DIAGNOSIS — R10.13 EPIGASTRIC PAIN: ICD-10-CM

## 2022-08-18 DIAGNOSIS — R10.11 RIGHT UPPER QUADRANT PAIN: ICD-10-CM

## 2022-08-18 DIAGNOSIS — K58.0 IRRITABLE BOWEL SYNDROME WITH DIARRHEA: ICD-10-CM

## 2022-08-18 DIAGNOSIS — K57.30 DIVERTICULOSIS OF LARGE INTESTINE WITHOUT PERFORATION OR ABS: ICD-10-CM

## 2022-08-18 DIAGNOSIS — Z83.71 FAMILY HISTORY OF COLONIC POLYPS: ICD-10-CM

## 2022-08-18 DIAGNOSIS — K57.32 DIVERTICULITIS OF LARGE INTESTINE WITHOUT PERFORATION OR ABS: ICD-10-CM

## 2022-08-18 PROCEDURE — 99214 OFFICE O/P EST MOD 30 MIN: CPT | Performed by: INTERNAL MEDICINE

## 2022-08-18 RX ORDER — ELUXADOLINE 75 MG/1
150 TABLET, FILM COATED ORAL
Qty: 180 | Refills: 2 | Status: ACTIVE
Start: 2018-06-08

## 2022-09-23 ENCOUNTER — APPOINTMENT (OUTPATIENT)
Dept: WOMENS IMAGING | Facility: HOSPITAL | Age: 77
End: 2022-09-23

## 2022-09-23 PROCEDURE — 77063 BREAST TOMOSYNTHESIS BI: CPT | Performed by: RADIOLOGY

## 2022-09-23 PROCEDURE — 77067 SCR MAMMO BI INCL CAD: CPT | Performed by: RADIOLOGY

## 2022-12-01 ENCOUNTER — OFFICE VISIT (OUTPATIENT)
Dept: CARDIOLOGY | Facility: CLINIC | Age: 77
End: 2022-12-01

## 2022-12-01 VITALS
HEIGHT: 62 IN | DIASTOLIC BLOOD PRESSURE: 93 MMHG | HEART RATE: 65 BPM | WEIGHT: 138 LBS | BODY MASS INDEX: 25.4 KG/M2 | SYSTOLIC BLOOD PRESSURE: 160 MMHG

## 2022-12-01 DIAGNOSIS — E78.2 MIXED HYPERLIPIDEMIA: ICD-10-CM

## 2022-12-01 DIAGNOSIS — I10 PRIMARY HYPERTENSION: Primary | ICD-10-CM

## 2022-12-01 PROCEDURE — 93000 ELECTROCARDIOGRAM COMPLETE: CPT | Performed by: INTERNAL MEDICINE

## 2022-12-01 PROCEDURE — 99213 OFFICE O/P EST LOW 20 MIN: CPT | Performed by: INTERNAL MEDICINE

## 2022-12-01 NOTE — PROGRESS NOTES
1 YR FOLLOW UP   Subjective:        Karly Nash is a 76 y.o. female who here for follow up    CC  Follow-up hypertension hyperlipidemia  HPI  76-year-old female with a primary hypertension, hyperlipidemia here for the follow-up with no complaints of chest pains tightness heaviness or the pressure sensation     Problems Addressed this Visit        Cardiac and Vasculature    Primary hypertension - Primary    Hyperlipidemia   Diagnoses       Codes Comments    Primary hypertension    -  Primary ICD-10-CM: I10  ICD-9-CM: 401.9     Mixed hyperlipidemia     ICD-10-CM: E78.2  ICD-9-CM: 272.2         .    The following portions of the patient's history were reviewed and updated as appropriate: allergies, current medications, past family history, past medical history, past social history, past surgical history and problem list.    Past Medical History:   Diagnosis Date   • Bloating    • Blood in stool    • Diverticulosis    • Exercise-induced reactive airway disease    • Family history of colonic polyps    • Flatulence    • Gas pain    • GERD (gastroesophageal reflux disease)    • History of CT scan of abdomen    • HTN (hypertension)    • IBS (irritable bowel syndrome)    • Osteoarthritis      reports that she quit smoking about 35 years ago. Her smoking use included cigarettes. She has never used smokeless tobacco. She reports current alcohol use of about 7.0 standard drinks per week. She reports that she does not use drugs.   Family History   Problem Relation Age of Onset   • Hypertension Mother    • Hyperlipidemia Mother    • Hypertension Father    • Hyperlipidemia Father    • Colon polyps Brother    • Diabetes Brother    • Hypertension Brother    • Hyperlipidemia Brother    • Cancer Paternal Grandmother         esophageal   • Crohn's disease Other    • Heart failure Maternal Aunt        Review of Systems  Constitutional: No wt loss, fever, fatigue  Gastrointestinal: No nausea, abdominal pain  Behavioral/Psych: No  "insomnia or anxiety   Cardiovascular no chest pains or tightness in the chest  Objective:       Physical Exam  /93   Pulse 65   Ht 157.5 cm (62\")   Wt 62.6 kg (138 lb)   BMI 25.24 kg/m²   General appearance: No acute changes   Neck: Trachea midline; NECK, supple, no thyromegaly or lymphadenopathy   Lungs: Normal size and shape, normal breath sounds, equal distribution of air, no rales and rhonchi   CV: S1-S2 regular, no murmurs, no rub, no gallop   Abdomen: Soft, nontender; no masses , no abnormal abdominal sounds   Extremities: No deformity , normal color , no peripheral edema   Skin: Normal temperature, turgor and texture; no rash, ulcers            ECG 12 Lead    Date/Time: 12/1/2022 11:47 AM  Performed by: Zaid Hargrove MD  Authorized by: Zaid Hargrove MD   Comparison: compared with previous ECG   Similar to previous ECG  Rhythm: sinus rhythm  ST Flattening: all    Clinical impression: non-specific ECG              Echocardiogram:        Current Outpatient Medications:   •  acetaminophen (TYLENOL) 500 MG tablet, Take 500 mg by mouth Every 6 (Six) Hours As Needed for mild pain (1-3)., Disp: , Rfl:   •  ascorbic acid (VITAMIN C) 500 MG tablet, Take 500 mg by mouth daily., Disp: , Rfl:   •  Calcium Carb-Cholecalciferol (CALCIUM 500 +D PO), Take  by mouth., Disp: , Rfl:   •  cetirizine (ZyrTEC) 10 MG tablet, Take 10 mg by mouth daily as needed for allergies., Disp: , Rfl:   •  Cholecalciferol (VITAMIN D-3) 1000 UNITS capsule, Take by mouth daily., Disp: , Rfl:   •  Coenzyme Q-10 200 MG capsule, Take  by mouth., Disp: , Rfl:   •  cyclobenzaprine (FLEXERIL) 10 MG tablet, Take 10 mg by mouth As Needed for Muscle Spasms., Disp: , Rfl:   •  denosumab (Prolia) 60 MG/ML solution prefilled syringe syringe, Inject 60 mg under the skin into the appropriate area as directed., Disp: , Rfl:   •  dicyclomine (BENTYL) 10 MG capsule, Take 10 mg by mouth 4 (Four) Times a Day Before Meals & at Bedtime., " Disp: , Rfl:   •  estradiol (VAGIFEM) 10 MCG tablet vaginal tablet, Insert 1 tablet into the vagina 2 (two) times a week., Disp: , Rfl:   •  FOLIC ACID PO, Take  by mouth., Disp: , Rfl:   •  Garlic (GARLIQUE PO), Take  by mouth., Disp: , Rfl:   •  glucosamine-chondroitin 500-400 MG per tablet, Take 1 tablet by mouth 2 (two) times a day., Disp: , Rfl:   •  guaiFENesin (MUCINEX) 600 MG 12 hr tablet, Take 1,200 mg by mouth 2 (Two) Times a Day., Disp: , Rfl:   •  Magnesium Oxide 400 (240 Mg) MG tablet, Take 1 tablet by mouth Daily., Disp: , Rfl:   •  nebivolol (BYSTOLIC) 20 MG tablet, Take 20 mg by mouth daily., Disp: , Rfl:   •  Omega-3-acid Ethyl Esters (LOVAZA PO), Take 1 g by mouth Daily., Disp: , Rfl:   •  omeprazole (PriLOSEC) 20 MG capsule, Take 20 mg by mouth 2 (two) times a day., Disp: , Rfl:   •  rosuvastatin (CRESTOR) 20 MG tablet, Take 20 mg by mouth Daily., Disp: , Rfl:   •  simethicone (MYLICON) 80 MG chewable tablet, Chew 80 mg Every 6 (Six) Hours As Needed for flatulence., Disp: , Rfl:   •  telmisartan (MICARDIS) 40 MG tablet, Take 40 mg by mouth Daily., Disp: , Rfl:   •  VIBERZI 75 MG tablet, , Disp: , Rfl:   •  vitamin B-12 (CYANOCOBALAMIN) 1000 MCG tablet, Take 1,000 mcg by mouth Daily., Disp: , Rfl:   •  vitamin B-6 (PYRIDOXINE) 50 MG tablet, Take 50 mg by mouth daily., Disp: , Rfl:   •  Zinc 100 MG tablet, Take  by mouth., Disp: , Rfl:   •  oyster shell calcium 500 MG tablet tablet, Take  by mouth 2 (Two) Times a Day., Disp: , Rfl:    Assessment:        Patient Active Problem List   Diagnosis   • Primary hypertension   • Hyperlipidemia   • Abnormal EKG               Plan:            ICD-10-CM ICD-9-CM   1. Primary hypertension  I10 401.9   2. Mixed hyperlipidemia  E78.2 272.2     1. Primary hypertension  Blood pressure well controlled at home    2. Mixed hyperlipidemia  Continue current treatment       bp at home  130/140- 70-80    See in 1 yr  COUNSELING:    Karly OlveratCounseling was given to  patient for the following topics: diagnostic results, risk factor reductions, impressions, risks and benefits of treatment options and importance of treatment compliance .       SMOKING COUNSELING:        Dictated using Dragon dictation

## 2023-01-31 VITALS
HEART RATE: 66 BPM | OXYGEN SATURATION: 99 % | RESPIRATION RATE: 13 BRPM | RESPIRATION RATE: 16 BRPM | HEART RATE: 69 BPM | SYSTOLIC BLOOD PRESSURE: 112 MMHG | HEIGHT: 62 IN | WEIGHT: 138 LBS | RESPIRATION RATE: 9 BRPM | HEART RATE: 72 BPM | DIASTOLIC BLOOD PRESSURE: 53 MMHG | RESPIRATION RATE: 15 BRPM | DIASTOLIC BLOOD PRESSURE: 80 MMHG | TEMPERATURE: 98.1 F | SYSTOLIC BLOOD PRESSURE: 138 MMHG | HEART RATE: 73 BPM | SYSTOLIC BLOOD PRESSURE: 100 MMHG | SYSTOLIC BLOOD PRESSURE: 97 MMHG | RESPIRATION RATE: 6 BRPM | HEART RATE: 64 BPM | RESPIRATION RATE: 10 BRPM | HEART RATE: 58 BPM | DIASTOLIC BLOOD PRESSURE: 76 MMHG | OXYGEN SATURATION: 98 % | SYSTOLIC BLOOD PRESSURE: 104 MMHG | RESPIRATION RATE: 8 BRPM | DIASTOLIC BLOOD PRESSURE: 47 MMHG | DIASTOLIC BLOOD PRESSURE: 71 MMHG | OXYGEN SATURATION: 100 % | DIASTOLIC BLOOD PRESSURE: 56 MMHG | RESPIRATION RATE: 14 BRPM | SYSTOLIC BLOOD PRESSURE: 133 MMHG

## 2023-02-06 ENCOUNTER — OFFICE (OUTPATIENT)
Dept: URBAN - METROPOLITAN AREA PATHOLOGY 4 | Facility: PATHOLOGY | Age: 78
End: 2023-02-06

## 2023-02-06 ENCOUNTER — AMBULATORY SURGICAL CENTER (OUTPATIENT)
Dept: URBAN - METROPOLITAN AREA SURGERY 17 | Facility: SURGERY | Age: 78
End: 2023-02-06

## 2023-02-06 DIAGNOSIS — K22.70 BARRETT'S ESOPHAGUS WITHOUT DYSPLASIA: ICD-10-CM

## 2023-02-06 DIAGNOSIS — K44.9 DIAPHRAGMATIC HERNIA WITHOUT OBSTRUCTION OR GANGRENE: ICD-10-CM

## 2023-02-06 DIAGNOSIS — K22.89 OTHER SPECIFIED DISEASE OF ESOPHAGUS: ICD-10-CM

## 2023-02-06 LAB
GI HISTOLOGY: A. SELECT: (no result)
GI HISTOLOGY: PDF REPORT: (no result)

## 2023-02-06 PROCEDURE — 43239 EGD BIOPSY SINGLE/MULTIPLE: CPT | Performed by: INTERNAL MEDICINE

## 2023-02-06 PROCEDURE — 88305 TISSUE EXAM BY PATHOLOGIST: CPT | Performed by: INTERNAL MEDICINE

## 2023-03-28 ENCOUNTER — OFFICE (OUTPATIENT)
Dept: URBAN - METROPOLITAN AREA CLINIC 76 | Facility: CLINIC | Age: 78
End: 2023-03-28

## 2023-03-28 VITALS
DIASTOLIC BLOOD PRESSURE: 83 MMHG | HEIGHT: 62 IN | OXYGEN SATURATION: 98 % | WEIGHT: 138 LBS | DIASTOLIC BLOOD PRESSURE: 81 MMHG | SYSTOLIC BLOOD PRESSURE: 161 MMHG | SYSTOLIC BLOOD PRESSURE: 157 MMHG | HEART RATE: 65 BPM

## 2023-03-28 DIAGNOSIS — Z83.71 FAMILY HISTORY OF COLONIC POLYPS: ICD-10-CM

## 2023-03-28 DIAGNOSIS — R10.11 RIGHT UPPER QUADRANT PAIN: ICD-10-CM

## 2023-03-28 DIAGNOSIS — Z79.82 LONG TERM (CURRENT) USE OF ASPIRIN: ICD-10-CM

## 2023-03-28 DIAGNOSIS — K21.9 GASTRO-ESOPHAGEAL REFLUX DISEASE WITHOUT ESOPHAGITIS: ICD-10-CM

## 2023-03-28 DIAGNOSIS — K58.0 IRRITABLE BOWEL SYNDROME WITH DIARRHEA: ICD-10-CM

## 2023-03-28 DIAGNOSIS — K22.70 BARRETT'S ESOPHAGUS WITHOUT DYSPLASIA: ICD-10-CM

## 2023-03-28 DIAGNOSIS — K57.32 DIVERTICULITIS OF LARGE INTESTINE WITHOUT PERFORATION OR ABS: ICD-10-CM

## 2023-03-28 PROCEDURE — 99214 OFFICE O/P EST MOD 30 MIN: CPT | Performed by: INTERNAL MEDICINE

## 2023-06-06 ENCOUNTER — OFFICE (OUTPATIENT)
Dept: URBAN - METROPOLITAN AREA CLINIC 76 | Facility: CLINIC | Age: 78
End: 2023-06-06

## 2023-06-06 ENCOUNTER — TRANSCRIBE ORDERS (OUTPATIENT)
Dept: ADMINISTRATIVE | Facility: HOSPITAL | Age: 78
End: 2023-06-06
Payer: MEDICARE

## 2023-06-06 VITALS
HEIGHT: 62 IN | WEIGHT: 138 LBS | SYSTOLIC BLOOD PRESSURE: 152 MMHG | OXYGEN SATURATION: 98 % | SYSTOLIC BLOOD PRESSURE: 148 MMHG | DIASTOLIC BLOOD PRESSURE: 88 MMHG

## 2023-06-06 DIAGNOSIS — R10.11 ABDOMINAL PAIN, RIGHT UPPER QUADRANT: Primary | ICD-10-CM

## 2023-06-06 DIAGNOSIS — Z79.82 LONG TERM (CURRENT) USE OF ASPIRIN: ICD-10-CM

## 2023-06-06 DIAGNOSIS — K31.7 POLYP, STOMACH: ICD-10-CM

## 2023-06-06 DIAGNOSIS — K57.32 DIVERTICULITIS, COLON: ICD-10-CM

## 2023-06-06 DIAGNOSIS — K21.9 GASTRO-ESOPHAGEAL REFLUX DISEASE WITHOUT ESOPHAGITIS: ICD-10-CM

## 2023-06-06 DIAGNOSIS — K44.9 DIAPHRAGMATIC HERNIA WITHOUT OBSTRUCTION OR GANGRENE: ICD-10-CM

## 2023-06-06 DIAGNOSIS — R10.11 RIGHT UPPER QUADRANT PAIN: ICD-10-CM

## 2023-06-06 DIAGNOSIS — K58.0 IRRITABLE BOWEL SYNDROME WITH DIARRHEA: ICD-10-CM

## 2023-06-06 DIAGNOSIS — D18.03 HEMANGIOMA OF INTRA-ABDOMINAL STRUCTURES: ICD-10-CM

## 2023-06-06 DIAGNOSIS — R10.13 ABDOMINAL PAIN, EPIGASTRIC: ICD-10-CM

## 2023-06-06 PROCEDURE — 99214 OFFICE O/P EST MOD 30 MIN: CPT | Performed by: NURSE PRACTITIONER

## 2023-06-08 ENCOUNTER — HOSPITAL ENCOUNTER (OUTPATIENT)
Dept: NUCLEAR MEDICINE | Facility: HOSPITAL | Age: 78
Discharge: HOME | End: 2023-06-08
Payer: MEDICARE

## 2023-06-08 ENCOUNTER — HOSPITAL ENCOUNTER (OUTPATIENT)
Dept: CT IMAGING | Facility: HOSPITAL | Age: 78
Discharge: HOME | End: 2023-06-08
Admitting: NURSE PRACTITIONER
Payer: MEDICARE

## 2023-06-08 DIAGNOSIS — K58.0 IRRITABLE BOWEL SYNDROME WITH DIARRHEA: ICD-10-CM

## 2023-06-08 DIAGNOSIS — R10.13 ABDOMINAL PAIN, EPIGASTRIC: ICD-10-CM

## 2023-06-08 DIAGNOSIS — K57.32 DIVERTICULITIS, COLON: ICD-10-CM

## 2023-06-08 DIAGNOSIS — R10.11 ABDOMINAL PAIN, RIGHT UPPER QUADRANT: ICD-10-CM

## 2023-06-08 DIAGNOSIS — K44.9 DIAPHRAGMATIC HERNIA WITHOUT OBSTRUCTION OR GANGRENE: ICD-10-CM

## 2023-06-08 DIAGNOSIS — K31.7 POLYP, STOMACH: ICD-10-CM

## 2023-06-08 LAB — CREAT BLDA-MCNC: 0.6 MG/DL (ref 0.6–1.3)

## 2023-06-08 PROCEDURE — 0 TECHNETIUM TC 99M MEBROFENIN KIT: Performed by: NURSE PRACTITIONER

## 2023-06-08 PROCEDURE — 25510000001 IOPAMIDOL 61 % SOLUTION: Performed by: NURSE PRACTITIONER

## 2023-06-08 PROCEDURE — 25010000002 SINCALIDE PER 5 MCG: Performed by: NURSE PRACTITIONER

## 2023-06-08 PROCEDURE — 0 DIATRIZOATE MEGLUMINE & SODIUM PER 1 ML: Performed by: NURSE PRACTITIONER

## 2023-06-08 PROCEDURE — 78227 HEPATOBIL SYST IMAGE W/DRUG: CPT

## 2023-06-08 PROCEDURE — 74177 CT ABD & PELVIS W/CONTRAST: CPT

## 2023-06-08 PROCEDURE — 82565 ASSAY OF CREATININE: CPT

## 2023-06-08 PROCEDURE — A9537 TC99M MEBROFENIN: HCPCS | Performed by: NURSE PRACTITIONER

## 2023-06-08 RX ORDER — KIT FOR THE PREPARATION OF TECHNETIUM TC 99M MEBROFENIN 45 MG/10ML
1 INJECTION, POWDER, LYOPHILIZED, FOR SOLUTION INTRAVENOUS
Status: COMPLETED | OUTPATIENT
Start: 2023-06-08 | End: 2023-06-08

## 2023-06-08 RX ADMIN — SODIUM CHLORIDE 1.3 MCG: 9 INJECTION, SOLUTION INTRAVENOUS at 13:07

## 2023-06-08 RX ADMIN — DIATRIZOATE MEGLUMINE AND DIATRIZOATE SODIUM 30 ML: 600; 100 SOLUTION ORAL; RECTAL at 15:12

## 2023-06-08 RX ADMIN — MEBROFENIN 1 DOSE: 45 INJECTION, POWDER, LYOPHILIZED, FOR SOLUTION INTRAVENOUS at 12:01

## 2023-06-08 RX ADMIN — IOPAMIDOL 85 ML: 612 INJECTION, SOLUTION INTRAVENOUS at 15:13

## 2023-06-19 VITALS
HEIGHT: 62 IN | SYSTOLIC BLOOD PRESSURE: 140 MMHG | WEIGHT: 140 LBS | DIASTOLIC BLOOD PRESSURE: 79 MMHG | OXYGEN SATURATION: 98 % | HEART RATE: 65 BPM

## 2023-06-20 ENCOUNTER — OFFICE (OUTPATIENT)
Dept: URBAN - METROPOLITAN AREA CLINIC 76 | Facility: CLINIC | Age: 78
End: 2023-06-20

## 2023-06-20 DIAGNOSIS — R10.13 EPIGASTRIC PAIN: ICD-10-CM

## 2023-06-20 DIAGNOSIS — K21.9 GASTRO-ESOPHAGEAL REFLUX DISEASE WITHOUT ESOPHAGITIS: ICD-10-CM

## 2023-06-20 DIAGNOSIS — R11.0 NAUSEA: ICD-10-CM

## 2023-06-20 DIAGNOSIS — Z79.1 LONG TERM (CURRENT) USE OF NON-STEROIDAL ANTI-INFLAMMATORIES: ICD-10-CM

## 2023-06-20 DIAGNOSIS — R10.11 RIGHT UPPER QUADRANT PAIN: ICD-10-CM

## 2023-06-20 DIAGNOSIS — K22.70 BARRETT'S ESOPHAGUS WITHOUT DYSPLASIA: ICD-10-CM

## 2023-06-20 DIAGNOSIS — Z83.71 FAMILY HISTORY OF COLONIC POLYPS: ICD-10-CM

## 2023-06-20 DIAGNOSIS — K44.9 DIAPHRAGMATIC HERNIA WITHOUT OBSTRUCTION OR GANGRENE: ICD-10-CM

## 2023-06-20 DIAGNOSIS — Z79.82 LONG TERM (CURRENT) USE OF ASPIRIN: ICD-10-CM

## 2023-06-20 DIAGNOSIS — K58.0 IRRITABLE BOWEL SYNDROME WITH DIARRHEA: ICD-10-CM

## 2023-06-20 PROCEDURE — 99214 OFFICE O/P EST MOD 30 MIN: CPT | Performed by: INTERNAL MEDICINE

## 2024-02-01 VITALS
HEIGHT: 62 IN | SYSTOLIC BLOOD PRESSURE: 148 MMHG | DIASTOLIC BLOOD PRESSURE: 73 MMHG | OXYGEN SATURATION: 98 % | HEART RATE: 60 BPM | WEIGHT: 144 LBS

## 2024-02-02 ENCOUNTER — OFFICE (OUTPATIENT)
Dept: URBAN - METROPOLITAN AREA CLINIC 76 | Facility: CLINIC | Age: 79
End: 2024-02-02

## 2024-02-02 DIAGNOSIS — R10.11 RIGHT UPPER QUADRANT PAIN: ICD-10-CM

## 2024-02-02 DIAGNOSIS — R10.13 EPIGASTRIC PAIN: ICD-10-CM

## 2024-02-02 DIAGNOSIS — Z79.82 LONG TERM (CURRENT) USE OF ASPIRIN: ICD-10-CM

## 2024-02-02 DIAGNOSIS — Z83.719 FAMILY HISTORY OF COLON POLYPS, UNSPECIFIED: ICD-10-CM

## 2024-02-02 DIAGNOSIS — Z87.19 PERSONAL HISTORY OF OTHER DISEASES OF THE DIGESTIVE SYSTEM: ICD-10-CM

## 2024-02-02 DIAGNOSIS — K21.9 GASTRO-ESOPHAGEAL REFLUX DISEASE WITHOUT ESOPHAGITIS: ICD-10-CM

## 2024-02-02 DIAGNOSIS — K58.0 IRRITABLE BOWEL SYNDROME WITH DIARRHEA: ICD-10-CM

## 2024-02-02 PROBLEM — K31.7 POLYP OF STOMACH AND DUODENUM: Status: ACTIVE | Noted: 2020-09-28

## 2024-02-02 PROBLEM — K44.9 DIAPHRAGMATIC HERNIA WITHOUT OBSTRUCTION OR GANGRENE: Status: ACTIVE | Noted: 2020-09-28

## 2024-02-02 PROBLEM — K22.89 OTHER SPECIFIED DISEASE OF ESOPHAGUS: Status: INACTIVE | Noted: 2023-02-06

## 2024-02-02 PROCEDURE — 99213 OFFICE O/P EST LOW 20 MIN: CPT | Performed by: INTERNAL MEDICINE

## 2024-02-02 RX ORDER — PANTOPRAZOLE 20 MG/1
20 TABLET, DELAYED RELEASE ORAL
Qty: 90 | Refills: 3 | Status: ACTIVE
Start: 2023-03-10

## 2024-04-30 ENCOUNTER — OFFICE (OUTPATIENT)
Dept: URBAN - METROPOLITAN AREA CLINIC 76 | Facility: CLINIC | Age: 79
End: 2024-04-30

## 2024-04-30 VITALS
DIASTOLIC BLOOD PRESSURE: 72 MMHG | HEART RATE: 60 BPM | WEIGHT: 143 LBS | HEIGHT: 62 IN | SYSTOLIC BLOOD PRESSURE: 142 MMHG

## 2024-04-30 DIAGNOSIS — Z83.719 FAMILY HISTORY OF COLON POLYPS, UNSPECIFIED: ICD-10-CM

## 2024-04-30 DIAGNOSIS — K22.70 BARRETT'S ESOPHAGUS WITHOUT DYSPLASIA: ICD-10-CM

## 2024-04-30 DIAGNOSIS — K58.0 IRRITABLE BOWEL SYNDROME WITH DIARRHEA: ICD-10-CM

## 2024-04-30 DIAGNOSIS — K21.9 GASTRO-ESOPHAGEAL REFLUX DISEASE WITHOUT ESOPHAGITIS: ICD-10-CM

## 2024-04-30 PROCEDURE — 99213 OFFICE O/P EST LOW 20 MIN: CPT | Performed by: NURSE PRACTITIONER

## 2024-11-08 VITALS
OXYGEN SATURATION: 96 % | HEART RATE: 58 BPM | SYSTOLIC BLOOD PRESSURE: 114 MMHG | RESPIRATION RATE: 6 BRPM | DIASTOLIC BLOOD PRESSURE: 58 MMHG | DIASTOLIC BLOOD PRESSURE: 53 MMHG | HEART RATE: 62 BPM | SYSTOLIC BLOOD PRESSURE: 152 MMHG | SYSTOLIC BLOOD PRESSURE: 180 MMHG | DIASTOLIC BLOOD PRESSURE: 70 MMHG | OXYGEN SATURATION: 100 % | DIASTOLIC BLOOD PRESSURE: 81 MMHG | DIASTOLIC BLOOD PRESSURE: 73 MMHG | SYSTOLIC BLOOD PRESSURE: 99 MMHG | DIASTOLIC BLOOD PRESSURE: 75 MMHG | SYSTOLIC BLOOD PRESSURE: 158 MMHG | SYSTOLIC BLOOD PRESSURE: 180 MMHG | RESPIRATION RATE: 12 BRPM | DIASTOLIC BLOOD PRESSURE: 81 MMHG | DIASTOLIC BLOOD PRESSURE: 57 MMHG | SYSTOLIC BLOOD PRESSURE: 162 MMHG | DIASTOLIC BLOOD PRESSURE: 53 MMHG | DIASTOLIC BLOOD PRESSURE: 70 MMHG | HEART RATE: 64 BPM | SYSTOLIC BLOOD PRESSURE: 152 MMHG | DIASTOLIC BLOOD PRESSURE: 53 MMHG | SYSTOLIC BLOOD PRESSURE: 99 MMHG | DIASTOLIC BLOOD PRESSURE: 70 MMHG | SYSTOLIC BLOOD PRESSURE: 99 MMHG | DIASTOLIC BLOOD PRESSURE: 57 MMHG | SYSTOLIC BLOOD PRESSURE: 180 MMHG | TEMPERATURE: 97.1 F | RESPIRATION RATE: 6 BRPM | HEART RATE: 53 BPM | HEART RATE: 68 BPM | SYSTOLIC BLOOD PRESSURE: 158 MMHG | OXYGEN SATURATION: 96 % | TEMPERATURE: 97.1 F | RESPIRATION RATE: 6 BRPM | WEIGHT: 142 LBS | HEART RATE: 68 BPM | WEIGHT: 142 LBS | OXYGEN SATURATION: 94 % | RESPIRATION RATE: 11 BRPM | RESPIRATION RATE: 12 BRPM | DIASTOLIC BLOOD PRESSURE: 84 MMHG | SYSTOLIC BLOOD PRESSURE: 152 MMHG | RESPIRATION RATE: 9 BRPM | SYSTOLIC BLOOD PRESSURE: 132 MMHG | RESPIRATION RATE: 11 BRPM | HEART RATE: 53 BPM | DIASTOLIC BLOOD PRESSURE: 53 MMHG | HEART RATE: 61 BPM | SYSTOLIC BLOOD PRESSURE: 132 MMHG | RESPIRATION RATE: 11 BRPM | HEART RATE: 62 BPM | HEART RATE: 64 BPM | TEMPERATURE: 96.4 F | SYSTOLIC BLOOD PRESSURE: 103 MMHG | HEART RATE: 68 BPM | SYSTOLIC BLOOD PRESSURE: 152 MMHG | HEART RATE: 62 BPM | HEIGHT: 62 IN | SYSTOLIC BLOOD PRESSURE: 158 MMHG | RESPIRATION RATE: 6 BRPM | DIASTOLIC BLOOD PRESSURE: 75 MMHG | DIASTOLIC BLOOD PRESSURE: 84 MMHG | HEIGHT: 62 IN | DIASTOLIC BLOOD PRESSURE: 57 MMHG | SYSTOLIC BLOOD PRESSURE: 114 MMHG | OXYGEN SATURATION: 96 % | DIASTOLIC BLOOD PRESSURE: 57 MMHG | DIASTOLIC BLOOD PRESSURE: 58 MMHG | SYSTOLIC BLOOD PRESSURE: 162 MMHG | SYSTOLIC BLOOD PRESSURE: 180 MMHG | RESPIRATION RATE: 12 BRPM | HEART RATE: 64 BPM | SYSTOLIC BLOOD PRESSURE: 114 MMHG | RESPIRATION RATE: 11 BRPM | SYSTOLIC BLOOD PRESSURE: 156 MMHG | WEIGHT: 142 LBS | HEART RATE: 65 BPM | DIASTOLIC BLOOD PRESSURE: 75 MMHG | DIASTOLIC BLOOD PRESSURE: 84 MMHG | SYSTOLIC BLOOD PRESSURE: 132 MMHG | TEMPERATURE: 97.1 F | RESPIRATION RATE: 9 BRPM | HEIGHT: 62 IN | DIASTOLIC BLOOD PRESSURE: 84 MMHG | RESPIRATION RATE: 9 BRPM | TEMPERATURE: 97.1 F | DIASTOLIC BLOOD PRESSURE: 53 MMHG | SYSTOLIC BLOOD PRESSURE: 156 MMHG | SYSTOLIC BLOOD PRESSURE: 103 MMHG | SYSTOLIC BLOOD PRESSURE: 156 MMHG | HEART RATE: 53 BPM | SYSTOLIC BLOOD PRESSURE: 132 MMHG | DIASTOLIC BLOOD PRESSURE: 75 MMHG | TEMPERATURE: 96.4 F | DIASTOLIC BLOOD PRESSURE: 81 MMHG | DIASTOLIC BLOOD PRESSURE: 75 MMHG | OXYGEN SATURATION: 94 % | OXYGEN SATURATION: 94 % | OXYGEN SATURATION: 96 % | OXYGEN SATURATION: 96 % | DIASTOLIC BLOOD PRESSURE: 58 MMHG | SYSTOLIC BLOOD PRESSURE: 180 MMHG | RESPIRATION RATE: 11 BRPM | TEMPERATURE: 97.1 F | DIASTOLIC BLOOD PRESSURE: 73 MMHG | SYSTOLIC BLOOD PRESSURE: 180 MMHG | SYSTOLIC BLOOD PRESSURE: 99 MMHG | OXYGEN SATURATION: 94 % | SYSTOLIC BLOOD PRESSURE: 132 MMHG | DIASTOLIC BLOOD PRESSURE: 75 MMHG | SYSTOLIC BLOOD PRESSURE: 132 MMHG | HEART RATE: 62 BPM | OXYGEN SATURATION: 98 % | SYSTOLIC BLOOD PRESSURE: 156 MMHG | OXYGEN SATURATION: 98 % | DIASTOLIC BLOOD PRESSURE: 73 MMHG | HEIGHT: 62 IN | DIASTOLIC BLOOD PRESSURE: 84 MMHG | HEART RATE: 64 BPM | SYSTOLIC BLOOD PRESSURE: 156 MMHG | TEMPERATURE: 97.1 F | HEART RATE: 53 BPM | DIASTOLIC BLOOD PRESSURE: 70 MMHG | DIASTOLIC BLOOD PRESSURE: 53 MMHG | DIASTOLIC BLOOD PRESSURE: 73 MMHG | SYSTOLIC BLOOD PRESSURE: 158 MMHG | SYSTOLIC BLOOD PRESSURE: 114 MMHG | HEART RATE: 59 BPM | HEART RATE: 59 BPM | HEART RATE: 64 BPM | DIASTOLIC BLOOD PRESSURE: 81 MMHG | SYSTOLIC BLOOD PRESSURE: 99 MMHG | DIASTOLIC BLOOD PRESSURE: 58 MMHG | RESPIRATION RATE: 16 BRPM | DIASTOLIC BLOOD PRESSURE: 75 MMHG | OXYGEN SATURATION: 100 % | SYSTOLIC BLOOD PRESSURE: 152 MMHG | HEART RATE: 62 BPM | DIASTOLIC BLOOD PRESSURE: 58 MMHG | RESPIRATION RATE: 12 BRPM | HEART RATE: 58 BPM | HEART RATE: 65 BPM | WEIGHT: 142 LBS | HEART RATE: 61 BPM | HEART RATE: 58 BPM | SYSTOLIC BLOOD PRESSURE: 156 MMHG | SYSTOLIC BLOOD PRESSURE: 103 MMHG | DIASTOLIC BLOOD PRESSURE: 73 MMHG | HEART RATE: 59 BPM | HEIGHT: 62 IN | DIASTOLIC BLOOD PRESSURE: 53 MMHG | OXYGEN SATURATION: 98 % | OXYGEN SATURATION: 98 % | SYSTOLIC BLOOD PRESSURE: 162 MMHG | DIASTOLIC BLOOD PRESSURE: 57 MMHG | HEART RATE: 59 BPM | RESPIRATION RATE: 9 BRPM | RESPIRATION RATE: 6 BRPM | RESPIRATION RATE: 9 BRPM | HEART RATE: 68 BPM | HEART RATE: 68 BPM | TEMPERATURE: 96.4 F | HEART RATE: 53 BPM | RESPIRATION RATE: 16 BRPM | OXYGEN SATURATION: 100 % | SYSTOLIC BLOOD PRESSURE: 103 MMHG | OXYGEN SATURATION: 100 % | OXYGEN SATURATION: 100 % | HEART RATE: 59 BPM | RESPIRATION RATE: 16 BRPM | RESPIRATION RATE: 12 BRPM | HEART RATE: 65 BPM | HEART RATE: 62 BPM | WEIGHT: 142 LBS | SYSTOLIC BLOOD PRESSURE: 152 MMHG | HEART RATE: 61 BPM | OXYGEN SATURATION: 94 % | RESPIRATION RATE: 16 BRPM | RESPIRATION RATE: 11 BRPM | DIASTOLIC BLOOD PRESSURE: 58 MMHG | TEMPERATURE: 96.4 F | RESPIRATION RATE: 16 BRPM | TEMPERATURE: 96.4 F | DIASTOLIC BLOOD PRESSURE: 70 MMHG | DIASTOLIC BLOOD PRESSURE: 84 MMHG | DIASTOLIC BLOOD PRESSURE: 70 MMHG | SYSTOLIC BLOOD PRESSURE: 156 MMHG | WEIGHT: 142 LBS | HEART RATE: 62 BPM | SYSTOLIC BLOOD PRESSURE: 103 MMHG | WEIGHT: 142 LBS | DIASTOLIC BLOOD PRESSURE: 57 MMHG | HEART RATE: 68 BPM | TEMPERATURE: 96.4 F | DIASTOLIC BLOOD PRESSURE: 73 MMHG | DIASTOLIC BLOOD PRESSURE: 84 MMHG | RESPIRATION RATE: 9 BRPM | SYSTOLIC BLOOD PRESSURE: 152 MMHG | HEART RATE: 59 BPM | OXYGEN SATURATION: 98 % | OXYGEN SATURATION: 100 % | SYSTOLIC BLOOD PRESSURE: 158 MMHG | RESPIRATION RATE: 12 BRPM | OXYGEN SATURATION: 100 % | SYSTOLIC BLOOD PRESSURE: 162 MMHG | SYSTOLIC BLOOD PRESSURE: 132 MMHG | TEMPERATURE: 96.4 F | HEART RATE: 58 BPM | OXYGEN SATURATION: 98 % | HEART RATE: 64 BPM | HEART RATE: 58 BPM | RESPIRATION RATE: 16 BRPM | RESPIRATION RATE: 6 BRPM | SYSTOLIC BLOOD PRESSURE: 180 MMHG | DIASTOLIC BLOOD PRESSURE: 58 MMHG | SYSTOLIC BLOOD PRESSURE: 114 MMHG | SYSTOLIC BLOOD PRESSURE: 114 MMHG | HEART RATE: 61 BPM | HEART RATE: 53 BPM | DIASTOLIC BLOOD PRESSURE: 81 MMHG | DIASTOLIC BLOOD PRESSURE: 57 MMHG | DIASTOLIC BLOOD PRESSURE: 70 MMHG | OXYGEN SATURATION: 98 % | SYSTOLIC BLOOD PRESSURE: 162 MMHG | DIASTOLIC BLOOD PRESSURE: 81 MMHG | DIASTOLIC BLOOD PRESSURE: 73 MMHG | HEART RATE: 65 BPM | RESPIRATION RATE: 6 BRPM | SYSTOLIC BLOOD PRESSURE: 99 MMHG | OXYGEN SATURATION: 96 % | HEIGHT: 62 IN | SYSTOLIC BLOOD PRESSURE: 158 MMHG | HEART RATE: 68 BPM | DIASTOLIC BLOOD PRESSURE: 81 MMHG | HEART RATE: 61 BPM | HEART RATE: 53 BPM | RESPIRATION RATE: 9 BRPM | HEIGHT: 62 IN | SYSTOLIC BLOOD PRESSURE: 103 MMHG | RESPIRATION RATE: 12 BRPM | HEART RATE: 58 BPM | SYSTOLIC BLOOD PRESSURE: 99 MMHG | RESPIRATION RATE: 16 BRPM | RESPIRATION RATE: 11 BRPM | HEART RATE: 58 BPM | HEART RATE: 65 BPM | HEART RATE: 61 BPM | HEART RATE: 59 BPM | OXYGEN SATURATION: 94 % | SYSTOLIC BLOOD PRESSURE: 114 MMHG | HEART RATE: 64 BPM | HEART RATE: 65 BPM | SYSTOLIC BLOOD PRESSURE: 103 MMHG | OXYGEN SATURATION: 96 % | TEMPERATURE: 97.1 F | SYSTOLIC BLOOD PRESSURE: 162 MMHG | OXYGEN SATURATION: 94 % | SYSTOLIC BLOOD PRESSURE: 162 MMHG | HEART RATE: 61 BPM | HEART RATE: 65 BPM | SYSTOLIC BLOOD PRESSURE: 158 MMHG

## 2024-11-14 ENCOUNTER — OFFICE (AMBULATORY)
Dept: URBAN - METROPOLITAN AREA PATHOLOGY 4 | Facility: PATHOLOGY | Age: 79
End: 2024-11-14
Payer: OTHER GOVERNMENT

## 2024-11-14 ENCOUNTER — OFFICE (AMBULATORY)
Age: 79
End: 2024-11-14
Payer: OTHER GOVERNMENT

## 2024-11-14 ENCOUNTER — AMBULATORY SURGICAL CENTER (AMBULATORY)
Age: 79
End: 2024-11-14
Payer: OTHER GOVERNMENT

## 2024-11-14 ENCOUNTER — AMBULATORY SURGICAL CENTER (AMBULATORY)
Dept: URBAN - METROPOLITAN AREA SURGERY 17 | Facility: SURGERY | Age: 79
End: 2024-11-14
Payer: OTHER GOVERNMENT

## 2024-11-14 DIAGNOSIS — K22.81 ESOPHAGEAL POLYP: ICD-10-CM

## 2024-11-14 DIAGNOSIS — K21.00 GASTRO-ESOPHAGEAL REFLUX DISEASE WITH ESOPHAGITIS, WITHOUT B: ICD-10-CM

## 2024-11-14 DIAGNOSIS — R11.0 NAUSEA: ICD-10-CM

## 2024-11-14 PROBLEM — K22.70 BARRETT'S ESOPHAGUS WITHOUT DYSPLASIA: Status: ACTIVE | Noted: 2024-11-14

## 2024-11-14 LAB
GI HISTOLOGY: A. STOMACH FUNDUS: (no result)
GI HISTOLOGY: B. GASTRO-ESOPHAGEAL JUNCTION: (no result)
GI HISTOLOGY: PDF REPORT: (no result)

## 2024-11-14 PROCEDURE — 43251 EGD REMOVE LESION SNARE: CPT | Performed by: INTERNAL MEDICINE

## 2024-11-14 PROCEDURE — 88305 TISSUE EXAM BY PATHOLOGIST: CPT | Performed by: PATHOLOGY

## 2024-11-14 PROCEDURE — 43239 EGD BIOPSY SINGLE/MULTIPLE: CPT | Mod: 59 | Performed by: INTERNAL MEDICINE

## 2025-05-28 ENCOUNTER — OFFICE (AMBULATORY)
Dept: URBAN - METROPOLITAN AREA CLINIC 76 | Facility: CLINIC | Age: 80
End: 2025-05-28
Payer: MEDICARE

## 2025-05-28 VITALS
WEIGHT: 141 LBS | HEART RATE: 58 BPM | DIASTOLIC BLOOD PRESSURE: 74 MMHG | SYSTOLIC BLOOD PRESSURE: 134 MMHG | HEIGHT: 62 IN | OXYGEN SATURATION: 96 %

## 2025-05-28 DIAGNOSIS — R10.11 RIGHT UPPER QUADRANT PAIN: ICD-10-CM

## 2025-05-28 DIAGNOSIS — K22.70 BARRETT'S ESOPHAGUS WITHOUT DYSPLASIA: ICD-10-CM

## 2025-05-28 DIAGNOSIS — K21.9 GASTRO-ESOPHAGEAL REFLUX DISEASE WITHOUT ESOPHAGITIS: ICD-10-CM

## 2025-05-28 DIAGNOSIS — K57.32 DIVERTICULITIS OF LARGE INTESTINE WITHOUT PERFORATION OR ABS: ICD-10-CM

## 2025-05-28 DIAGNOSIS — R11.0 NAUSEA: ICD-10-CM

## 2025-05-28 DIAGNOSIS — K58.0 IRRITABLE BOWEL SYNDROME WITH DIARRHEA: ICD-10-CM

## 2025-05-28 DIAGNOSIS — Z83.719 FAMILY HISTORY OF COLON POLYPS, UNSPECIFIED: ICD-10-CM

## 2025-05-28 DIAGNOSIS — Z79.82 LONG TERM (CURRENT) USE OF ASPIRIN: ICD-10-CM

## 2025-05-28 DIAGNOSIS — K57.30 DIVERTICULOSIS OF LARGE INTESTINE WITHOUT PERFORATION OR ABS: ICD-10-CM

## 2025-05-28 PROCEDURE — 99214 OFFICE O/P EST MOD 30 MIN: CPT | Performed by: INTERNAL MEDICINE

## 2025-05-28 RX ORDER — ELUXADOLINE 75 MG/1
150 TABLET, FILM COATED ORAL
Qty: 60 | Refills: 1 | Status: ACTIVE
Start: 2018-06-08